# Patient Record
Sex: MALE | Race: WHITE | NOT HISPANIC OR LATINO | Employment: OTHER | URBAN - NONMETROPOLITAN AREA
[De-identification: names, ages, dates, MRNs, and addresses within clinical notes are randomized per-mention and may not be internally consistent; named-entity substitution may affect disease eponyms.]

---

## 2023-12-26 ENCOUNTER — OUTSIDE FACILITY SERVICE (OUTPATIENT)
Dept: CARDIOLOGY | Facility: CLINIC | Age: 88
End: 2023-12-26
Payer: MEDICARE

## 2023-12-26 PROCEDURE — 99222 1ST HOSP IP/OBS MODERATE 55: CPT | Performed by: INTERNAL MEDICINE

## 2023-12-27 ENCOUNTER — OUTSIDE FACILITY SERVICE (OUTPATIENT)
Dept: CARDIOLOGY | Facility: CLINIC | Age: 88
End: 2023-12-27
Payer: MEDICARE

## 2023-12-27 PROCEDURE — 99232 SBSQ HOSP IP/OBS MODERATE 35: CPT | Performed by: INTERNAL MEDICINE

## 2023-12-28 ENCOUNTER — OUTSIDE FACILITY SERVICE (OUTPATIENT)
Dept: CARDIOLOGY | Facility: CLINIC | Age: 88
End: 2023-12-28
Payer: MEDICARE

## 2023-12-28 PROCEDURE — 99232 SBSQ HOSP IP/OBS MODERATE 35: CPT | Performed by: INTERNAL MEDICINE

## 2023-12-29 ENCOUNTER — OUTSIDE FACILITY SERVICE (OUTPATIENT)
Dept: CARDIOLOGY | Facility: CLINIC | Age: 88
End: 2023-12-29
Payer: MEDICARE

## 2023-12-29 PROCEDURE — 99232 SBSQ HOSP IP/OBS MODERATE 35: CPT | Performed by: INTERNAL MEDICINE

## 2024-08-25 ENCOUNTER — APPOINTMENT (OUTPATIENT)
Dept: GENERAL RADIOLOGY | Facility: HOSPITAL | Age: 89
End: 2024-08-25
Payer: MEDICARE

## 2024-08-25 ENCOUNTER — HOSPITAL ENCOUNTER (OUTPATIENT)
Facility: HOSPITAL | Age: 89
Setting detail: OBSERVATION
Discharge: HOME HEALTH | End: 2024-08-26
Attending: EMERGENCY MEDICINE | Admitting: INTERNAL MEDICINE
Payer: MEDICARE

## 2024-08-25 ENCOUNTER — APPOINTMENT (OUTPATIENT)
Dept: CT IMAGING | Facility: HOSPITAL | Age: 89
End: 2024-08-25
Payer: MEDICARE

## 2024-08-25 DIAGNOSIS — R47.81 SLURRED SPEECH: Primary | ICD-10-CM

## 2024-08-25 DIAGNOSIS — R53.1 GENERALIZED WEAKNESS: ICD-10-CM

## 2024-08-25 DIAGNOSIS — R41.841 COGNITIVE COMMUNICATION DEFICIT: ICD-10-CM

## 2024-08-25 DIAGNOSIS — N28.9 ACUTE RENAL INSUFFICIENCY: ICD-10-CM

## 2024-08-25 DIAGNOSIS — R09.02 HYPOXIA: ICD-10-CM

## 2024-08-25 DIAGNOSIS — I50.43 ACUTE ON CHRONIC COMBINED SYSTOLIC AND DIASTOLIC CHF (CONGESTIVE HEART FAILURE): ICD-10-CM

## 2024-08-25 PROBLEM — R25.1 TREMOR: Status: ACTIVE | Noted: 2024-02-22

## 2024-08-25 PROBLEM — I25.10 CORONARY ARTERY DISEASE INVOLVING NATIVE CORONARY ARTERY OF NATIVE HEART WITHOUT ANGINA PECTORIS: Status: ACTIVE | Noted: 2024-08-25

## 2024-08-25 PROBLEM — M48.061 FORAMINAL STENOSIS OF LUMBAR REGION: Status: ACTIVE | Noted: 2021-07-10

## 2024-08-25 PROBLEM — E11.9 TYPE 2 DIABETES MELLITUS: Status: ACTIVE | Noted: 2024-08-25

## 2024-08-25 PROBLEM — Z95.5 HISTORY OF CORONARY ARTERY STENT PLACEMENT: Status: ACTIVE | Noted: 2020-12-31

## 2024-08-25 PROBLEM — G45.9 TIA (TRANSIENT ISCHEMIC ATTACK): Status: ACTIVE | Noted: 2024-08-25

## 2024-08-25 PROBLEM — E78.5 HYPERLIPIDEMIA: Status: ACTIVE | Noted: 2020-12-31

## 2024-08-25 LAB
ALBUMIN SERPL-MCNC: 3.6 G/DL (ref 3.5–5.2)
ALBUMIN/GLOB SERPL: 1.1 G/DL
ALP SERPL-CCNC: 84 U/L (ref 39–117)
ALT SERPL W P-5'-P-CCNC: 12 U/L (ref 1–41)
ANION GAP SERPL CALCULATED.3IONS-SCNC: 9 MMOL/L (ref 5–15)
APTT PPP: 27.7 SECONDS (ref 22–39)
ARTERIAL PATENCY WRIST A: ABNORMAL
AST SERPL-CCNC: 20 U/L (ref 1–40)
ATMOSPHERIC PRESS: ABNORMAL MM[HG]
BASE EXCESS BLDA CALC-SCNC: 5.6 MMOL/L (ref 0–2)
BASOPHILS # BLD AUTO: 0.05 10*3/MM3 (ref 0–0.2)
BASOPHILS NFR BLD AUTO: 0.5 % (ref 0–1.5)
BDY SITE: ABNORMAL
BILIRUB SERPL-MCNC: 0.4 MG/DL (ref 0–1.2)
BILIRUB UR QL STRIP: NEGATIVE
BODY TEMPERATURE: 37
BUN BLDA-MCNC: 25 MG/DL (ref 8–26)
BUN SERPL-MCNC: 25 MG/DL (ref 8–23)
BUN/CREAT SERPL: 17.6 (ref 7–25)
CA-I BLDA-SCNC: 1.15 MMOL/L (ref 1.2–1.32)
CALCIUM SPEC-SCNC: 8.5 MG/DL (ref 8.6–10.5)
CHLORIDE BLDA-SCNC: 98 MMOL/L (ref 98–109)
CHLORIDE SERPL-SCNC: 100 MMOL/L (ref 98–107)
CLARITY UR: CLEAR
CO2 BLDA-SCNC: 27 MMOL/L (ref 24–29)
CO2 BLDA-SCNC: 33.1 MMOL/L (ref 22–33)
CO2 SERPL-SCNC: 26 MMOL/L (ref 22–29)
COHGB MFR BLD: 0.8 % (ref 0–2)
COLOR UR: YELLOW
CREAT BLDA-MCNC: 1.6 MG/DL (ref 0.6–1.3)
CREAT SERPL-MCNC: 1.42 MG/DL (ref 0.76–1.27)
D-LACTATE SERPL-SCNC: 1.1 MMOL/L (ref 0.5–2)
DEPRECATED RDW RBC AUTO: 55.4 FL (ref 37–54)
EGFRCR SERPLBLD CKD-EPI 2021: 41.4 ML/MIN/1.73
EGFRCR SERPLBLD CKD-EPI 2021: 47.8 ML/MIN/1.73
EOSINOPHIL # BLD AUTO: 0.11 10*3/MM3 (ref 0–0.4)
EOSINOPHIL NFR BLD AUTO: 1.1 % (ref 0.3–6.2)
EPAP: 0
ERYTHROCYTE [DISTWIDTH] IN BLOOD BY AUTOMATED COUNT: 14.7 % (ref 12.3–15.4)
FLUAV RNA RESP QL NAA+PROBE: NOT DETECTED
FLUBV RNA RESP QL NAA+PROBE: NOT DETECTED
GLOBULIN UR ELPH-MCNC: 3.2 GM/DL
GLUCOSE BLDC GLUCOMTR-MCNC: 115 MG/DL (ref 70–130)
GLUCOSE SERPL-MCNC: 116 MG/DL (ref 65–99)
GLUCOSE UR STRIP-MCNC: ABNORMAL MG/DL
HCO3 BLDA-SCNC: 31.6 MMOL/L (ref 20–26)
HCT VFR BLD AUTO: 40.5 % (ref 37.5–51)
HCT VFR BLD CALC: 39.1 % (ref 38–51)
HCT VFR BLDA CALC: 40 % (ref 38–51)
HGB BLD-MCNC: 12.8 G/DL (ref 13–17.7)
HGB BLDA-MCNC: 12.8 G/DL (ref 13.5–17.5)
HGB BLDA-MCNC: 13.6 G/DL (ref 12–17)
HGB UR QL STRIP.AUTO: NEGATIVE
HOLD SPECIMEN: NORMAL
IMM GRANULOCYTES # BLD AUTO: 0.18 10*3/MM3 (ref 0–0.05)
IMM GRANULOCYTES NFR BLD AUTO: 1.9 % (ref 0–0.5)
INHALED O2 CONCENTRATION: 21 %
INR PPP: 1.3 (ref 0.8–1.2)
IPAP: 0
KETONES UR QL STRIP: NEGATIVE
LEUKOCYTE ESTERASE UR QL STRIP.AUTO: NEGATIVE
LYMPHOCYTES # BLD AUTO: 1.25 10*3/MM3 (ref 0.7–3.1)
LYMPHOCYTES NFR BLD AUTO: 12.9 % (ref 19.6–45.3)
MCH RBC QN AUTO: 32.1 PG (ref 26.6–33)
MCHC RBC AUTO-ENTMCNC: 31.6 G/DL (ref 31.5–35.7)
MCV RBC AUTO: 101.5 FL (ref 79–97)
METHGB BLD QL: 0.1 % (ref 0–1.5)
MODALITY: ABNORMAL
MONOCYTES # BLD AUTO: 0.85 10*3/MM3 (ref 0.1–0.9)
MONOCYTES NFR BLD AUTO: 8.8 % (ref 5–12)
NEUTROPHILS NFR BLD AUTO: 7.26 10*3/MM3 (ref 1.7–7)
NEUTROPHILS NFR BLD AUTO: 74.8 % (ref 42.7–76)
NITRITE UR QL STRIP: NEGATIVE
NRBC BLD AUTO-RTO: 0 /100 WBC (ref 0–0.2)
OXYHGB MFR BLDV: 78.1 % (ref 94–99)
PAW @ PEAK INSP FLOW SETTING VENT: 0 CMH2O
PCO2 BLDA: 50.7 MM HG (ref 35–45)
PCO2 TEMP ADJ BLD: 50.7 MM HG (ref 35–48)
PH BLDA: 7.4 PH UNITS (ref 7.35–7.45)
PH UR STRIP.AUTO: <=5 [PH] (ref 5–8)
PH, TEMP CORRECTED: 7.4 PH UNITS
PLATELET # BLD AUTO: 132 10*3/MM3 (ref 140–450)
PMV BLD AUTO: 10.9 FL (ref 6–12)
PO2 BLDA: 48.1 MM HG (ref 83–108)
PO2 TEMP ADJ BLD: 48.1 MM HG (ref 83–108)
POTASSIUM BLDA-SCNC: 4.7 MMOL/L (ref 3.5–4.9)
POTASSIUM SERPL-SCNC: 4.8 MMOL/L (ref 3.5–5.2)
PROCALCITONIN SERPL-MCNC: 0.07 NG/ML (ref 0–0.25)
PROT SERPL-MCNC: 6.8 G/DL (ref 6–8.5)
PROT UR QL STRIP: NEGATIVE
PROTHROMBIN TIME: 14.9 SECONDS (ref 12.8–15.2)
QT INTERVAL: 440 MS
QTC INTERVAL: 491 MS
RBC # BLD AUTO: 3.99 10*6/MM3 (ref 4.14–5.8)
SARS-COV-2 RNA RESP QL NAA+PROBE: NOT DETECTED
SODIUM BLD-SCNC: 138 MMOL/L (ref 138–146)
SODIUM SERPL-SCNC: 135 MMOL/L (ref 136–145)
SP GR UR STRIP: 1.02 (ref 1–1.03)
TOTAL RATE: 0 BREATHS/MINUTE
TROPONIN T SERPL HS-MCNC: 34 NG/L
TSH SERPL DL<=0.05 MIU/L-ACNC: 0.95 UIU/ML (ref 0.27–4.2)
UROBILINOGEN UR QL STRIP: ABNORMAL
VENTILATOR MODE: ABNORMAL
WBC NRBC COR # BLD AUTO: 9.7 10*3/MM3 (ref 3.4–10.8)
WHOLE BLOOD HOLD COAG: NORMAL
WHOLE BLOOD HOLD SPECIMEN: NORMAL

## 2024-08-25 PROCEDURE — 82375 ASSAY CARBOXYHB QUANT: CPT

## 2024-08-25 PROCEDURE — 85730 THROMBOPLASTIN TIME PARTIAL: CPT | Performed by: EMERGENCY MEDICINE

## 2024-08-25 PROCEDURE — 82805 BLOOD GASES W/O2 SATURATION: CPT

## 2024-08-25 PROCEDURE — P9612 CATHETERIZE FOR URINE SPEC: HCPCS

## 2024-08-25 PROCEDURE — G0378 HOSPITAL OBSERVATION PER HR: HCPCS

## 2024-08-25 PROCEDURE — 84484 ASSAY OF TROPONIN QUANT: CPT | Performed by: EMERGENCY MEDICINE

## 2024-08-25 PROCEDURE — 83050 HGB METHEMOGLOBIN QUAN: CPT

## 2024-08-25 PROCEDURE — 93005 ELECTROCARDIOGRAM TRACING: CPT | Performed by: EMERGENCY MEDICINE

## 2024-08-25 PROCEDURE — 85014 HEMATOCRIT: CPT | Performed by: EMERGENCY MEDICINE

## 2024-08-25 PROCEDURE — 99223 1ST HOSP IP/OBS HIGH 75: CPT | Performed by: INTERNAL MEDICINE

## 2024-08-25 PROCEDURE — 87636 SARSCOV2 & INF A&B AMP PRB: CPT | Performed by: EMERGENCY MEDICINE

## 2024-08-25 PROCEDURE — 70450 CT HEAD/BRAIN W/O DYE: CPT

## 2024-08-25 PROCEDURE — 99291 CRITICAL CARE FIRST HOUR: CPT

## 2024-08-25 PROCEDURE — 25810000003 SODIUM CHLORIDE 0.9 % SOLUTION: Performed by: INTERNAL MEDICINE

## 2024-08-25 PROCEDURE — 71045 X-RAY EXAM CHEST 1 VIEW: CPT

## 2024-08-25 PROCEDURE — 87040 BLOOD CULTURE FOR BACTERIA: CPT | Performed by: EMERGENCY MEDICINE

## 2024-08-25 PROCEDURE — 80047 BASIC METABLC PNL IONIZED CA: CPT | Performed by: EMERGENCY MEDICINE

## 2024-08-25 PROCEDURE — 36600 WITHDRAWAL OF ARTERIAL BLOOD: CPT

## 2024-08-25 PROCEDURE — 80053 COMPREHEN METABOLIC PANEL: CPT | Performed by: EMERGENCY MEDICINE

## 2024-08-25 PROCEDURE — 96372 THER/PROPH/DIAG INJ SC/IM: CPT

## 2024-08-25 PROCEDURE — 99214 OFFICE O/P EST MOD 30 MIN: CPT | Performed by: NURSE PRACTITIONER

## 2024-08-25 PROCEDURE — 84443 ASSAY THYROID STIM HORMONE: CPT | Performed by: INTERNAL MEDICINE

## 2024-08-25 PROCEDURE — 84145 PROCALCITONIN (PCT): CPT | Performed by: EMERGENCY MEDICINE

## 2024-08-25 PROCEDURE — 83605 ASSAY OF LACTIC ACID: CPT | Performed by: EMERGENCY MEDICINE

## 2024-08-25 PROCEDURE — 36415 COLL VENOUS BLD VENIPUNCTURE: CPT

## 2024-08-25 PROCEDURE — 87154 CUL TYP ID BLD PTHGN 6+ TRGT: CPT | Performed by: EMERGENCY MEDICINE

## 2024-08-25 PROCEDURE — 85610 PROTHROMBIN TIME: CPT | Performed by: EMERGENCY MEDICINE

## 2024-08-25 PROCEDURE — 87147 CULTURE TYPE IMMUNOLOGIC: CPT | Performed by: EMERGENCY MEDICINE

## 2024-08-25 PROCEDURE — 85025 COMPLETE CBC W/AUTO DIFF WBC: CPT | Performed by: EMERGENCY MEDICINE

## 2024-08-25 PROCEDURE — 81003 URINALYSIS AUTO W/O SCOPE: CPT | Performed by: EMERGENCY MEDICINE

## 2024-08-25 PROCEDURE — 25010000002 ENOXAPARIN PER 10 MG: Performed by: INTERNAL MEDICINE

## 2024-08-25 PROCEDURE — 96361 HYDRATE IV INFUSION ADD-ON: CPT

## 2024-08-25 RX ORDER — BISACODYL 10 MG
10 SUPPOSITORY, RECTAL RECTAL DAILY PRN
Status: DISCONTINUED | OUTPATIENT
Start: 2024-08-25 | End: 2024-08-26 | Stop reason: HOSPADM

## 2024-08-25 RX ORDER — FERROUS SULFATE 325(65) MG
325 TABLET ORAL
COMMUNITY

## 2024-08-25 RX ORDER — RISPERIDONE 1 MG/1
1 TABLET ORAL DAILY
COMMUNITY

## 2024-08-25 RX ORDER — DOXYCYCLINE HYCLATE 100 MG
100 TABLET ORAL 2 TIMES DAILY
COMMUNITY
End: 2024-08-26 | Stop reason: HOSPADM

## 2024-08-25 RX ORDER — RISPERIDONE 1 MG/1
1 TABLET ORAL NIGHTLY
Status: DISCONTINUED | OUTPATIENT
Start: 2024-08-25 | End: 2024-08-26 | Stop reason: HOSPADM

## 2024-08-25 RX ORDER — ENOXAPARIN SODIUM 100 MG/ML
30 INJECTION SUBCUTANEOUS EVERY 24 HOURS
Status: DISCONTINUED | OUTPATIENT
Start: 2024-08-25 | End: 2024-08-26

## 2024-08-25 RX ORDER — ACETAMINOPHEN 325 MG/1
650 TABLET ORAL EVERY 6 HOURS PRN
Status: DISCONTINUED | OUTPATIENT
Start: 2024-08-25 | End: 2024-08-26 | Stop reason: HOSPADM

## 2024-08-25 RX ORDER — SODIUM CHLORIDE 0.9 % (FLUSH) 0.9 %
10 SYRINGE (ML) INJECTION AS NEEDED
Status: DISCONTINUED | OUTPATIENT
Start: 2024-08-25 | End: 2024-08-26 | Stop reason: HOSPADM

## 2024-08-25 RX ORDER — RISPERIDONE 0.5 MG/1
0.5 TABLET ORAL DAILY
COMMUNITY
End: 2024-08-26 | Stop reason: HOSPADM

## 2024-08-25 RX ORDER — LANOLIN ALCOHOL/MO/W.PET/CERES
1000 CREAM (GRAM) TOPICAL DAILY
Status: DISCONTINUED | OUTPATIENT
Start: 2024-08-26 | End: 2024-08-26 | Stop reason: HOSPADM

## 2024-08-25 RX ORDER — ASPIRIN 300 MG/1
300 SUPPOSITORY RECTAL DAILY
Status: DISCONTINUED | OUTPATIENT
Start: 2024-08-26 | End: 2024-08-26 | Stop reason: HOSPADM

## 2024-08-25 RX ORDER — INSULIN LISPRO 100 [IU]/ML
2-7 INJECTION, SOLUTION INTRAVENOUS; SUBCUTANEOUS
Status: DISCONTINUED | OUTPATIENT
Start: 2024-08-26 | End: 2024-08-26 | Stop reason: HOSPADM

## 2024-08-25 RX ORDER — ATORVASTATIN CALCIUM 40 MG/1
40 TABLET, FILM COATED ORAL NIGHTLY
Status: DISCONTINUED | OUTPATIENT
Start: 2024-08-25 | End: 2024-08-26 | Stop reason: HOSPADM

## 2024-08-25 RX ORDER — ATORVASTATIN CALCIUM 80 MG/1
80 TABLET, FILM COATED ORAL DAILY
COMMUNITY

## 2024-08-25 RX ORDER — BISACODYL 5 MG/1
5 TABLET, DELAYED RELEASE ORAL DAILY PRN
Status: DISCONTINUED | OUTPATIENT
Start: 2024-08-25 | End: 2024-08-26 | Stop reason: HOSPADM

## 2024-08-25 RX ORDER — INSULIN ASPART 100 [IU]/ML
53 INJECTION, SUSPENSION SUBCUTANEOUS NIGHTLY
COMMUNITY

## 2024-08-25 RX ORDER — IBUPROFEN 600 MG/1
1 TABLET ORAL
Status: DISCONTINUED | OUTPATIENT
Start: 2024-08-25 | End: 2024-08-26 | Stop reason: HOSPADM

## 2024-08-25 RX ORDER — METOPROLOL SUCCINATE 25 MG/1
25 TABLET, EXTENDED RELEASE ORAL DAILY
Status: DISCONTINUED | OUTPATIENT
Start: 2024-08-26 | End: 2024-08-26 | Stop reason: HOSPADM

## 2024-08-25 RX ORDER — ASPIRIN 81 MG/1
81 TABLET ORAL DAILY
COMMUNITY

## 2024-08-25 RX ORDER — FUROSEMIDE 20 MG
20 TABLET ORAL 2 TIMES DAILY
COMMUNITY

## 2024-08-25 RX ORDER — NICOTINE POLACRILEX 4 MG
15 LOZENGE BUCCAL
Status: DISCONTINUED | OUTPATIENT
Start: 2024-08-25 | End: 2024-08-26 | Stop reason: HOSPADM

## 2024-08-25 RX ORDER — ERGOCALCIFEROL 1.25 MG/1
50000 CAPSULE, LIQUID FILLED ORAL WEEKLY
COMMUNITY
Start: 2024-08-26

## 2024-08-25 RX ORDER — AMOXICILLIN 250 MG
2 CAPSULE ORAL 2 TIMES DAILY
Status: DISCONTINUED | OUTPATIENT
Start: 2024-08-26 | End: 2024-08-26 | Stop reason: HOSPADM

## 2024-08-25 RX ORDER — ASPIRIN 81 MG/1
81 TABLET, CHEWABLE ORAL DAILY
Status: DISCONTINUED | OUTPATIENT
Start: 2024-08-26 | End: 2024-08-26 | Stop reason: HOSPADM

## 2024-08-25 RX ORDER — DEXTROSE MONOHYDRATE 25 G/50ML
25 INJECTION, SOLUTION INTRAVENOUS
Status: DISCONTINUED | OUTPATIENT
Start: 2024-08-25 | End: 2024-08-26 | Stop reason: HOSPADM

## 2024-08-25 RX ORDER — ISOSORBIDE MONONITRATE 30 MG/1
30 TABLET, EXTENDED RELEASE ORAL 2 TIMES DAILY
COMMUNITY

## 2024-08-25 RX ORDER — ALLOPURINOL 300 MG/1
300 TABLET ORAL DAILY
Status: DISCONTINUED | OUTPATIENT
Start: 2024-08-26 | End: 2024-08-26 | Stop reason: HOSPADM

## 2024-08-25 RX ORDER — ALLOPURINOL 300 MG/1
300 TABLET ORAL DAILY
COMMUNITY

## 2024-08-25 RX ORDER — HYDROXYZINE HYDROCHLORIDE 25 MG/1
25 TABLET, FILM COATED ORAL 2 TIMES DAILY PRN
COMMUNITY

## 2024-08-25 RX ORDER — SODIUM CHLORIDE 9 MG/ML
100 INJECTION, SOLUTION INTRAVENOUS CONTINUOUS
Status: ACTIVE | OUTPATIENT
Start: 2024-08-25 | End: 2024-08-26

## 2024-08-25 RX ORDER — INSULIN ASPART 100 [IU]/ML
33 INJECTION, SUSPENSION SUBCUTANEOUS DAILY
COMMUNITY

## 2024-08-25 RX ORDER — LANOLIN ALCOHOL/MO/W.PET/CERES
1000 CREAM (GRAM) TOPICAL DAILY
COMMUNITY

## 2024-08-25 RX ORDER — ISOSORBIDE MONONITRATE 30 MG/1
30 TABLET, EXTENDED RELEASE ORAL
Status: DISCONTINUED | OUTPATIENT
Start: 2024-08-26 | End: 2024-08-26 | Stop reason: HOSPADM

## 2024-08-25 RX ORDER — RISPERIDONE 0.25 MG/1
0.5 TABLET ORAL DAILY
Status: DISCONTINUED | OUTPATIENT
Start: 2024-08-26 | End: 2024-08-26 | Stop reason: HOSPADM

## 2024-08-25 RX ORDER — POLYETHYLENE GLYCOL 3350 17 G/17G
17 POWDER, FOR SOLUTION ORAL DAILY PRN
Status: DISCONTINUED | OUTPATIENT
Start: 2024-08-25 | End: 2024-08-26 | Stop reason: HOSPADM

## 2024-08-25 RX ORDER — RANOLAZINE 500 MG/1
500 TABLET, EXTENDED RELEASE ORAL 2 TIMES DAILY
COMMUNITY

## 2024-08-25 RX ORDER — METOPROLOL SUCCINATE 25 MG/1
25 TABLET, EXTENDED RELEASE ORAL DAILY
COMMUNITY

## 2024-08-25 RX ADMIN — SODIUM CHLORIDE 100 ML/HR: 9 INJECTION, SOLUTION INTRAVENOUS at 21:44

## 2024-08-25 RX ADMIN — Medication 10 ML: at 21:32

## 2024-08-25 RX ADMIN — ENOXAPARIN SODIUM 30 MG: 30 INJECTION SUBCUTANEOUS at 21:32

## 2024-08-25 RX ADMIN — ATORVASTATIN CALCIUM 40 MG: 40 TABLET, FILM COATED ORAL at 21:33

## 2024-08-25 RX ADMIN — Medication 10 ML: at 21:58

## 2024-08-25 RX ADMIN — SODIUM CHLORIDE 1000 ML: 9 INJECTION, SOLUTION INTRAVENOUS at 21:44

## 2024-08-25 NOTE — Clinical Note
Level of Care: Telemetry [5]   Diagnosis: TIA (transient ischemic attack) [979242]   Admitting Physician: ANA LEMOS [[id number]]

## 2024-08-25 NOTE — CONSULTS
Stroke Consult Note    Patient Name: Gurdeep Burrows   MRN: [id number]  Age: 89 y.o.  Sex: male  : 1935    Primary Care Physician: Aliza Manuel PA  Referring Physician:  Dr. Wyatt Villanueva    TIME STROKE TEAM CALLED: 1330 EST     TIME PATIENT SEEN: 1335 EST    Handedness: Right  Race:     Chief Complaint/Reason for Consultation: Altered mental status, slurred speech    Subjective .  HPI: Gurdeep Burrows is an 89 year old  male with a PMH significant for dementia, chronic debility, T2DM, tremors, respiratory failure, chronic heart failure, obesity, anemia, CKD.  He presents to the [facility] emergency department via EMS with complaints of slurred speech and bilateral lower extremity weakness.  Unfortunately family has not been able to be contacted so PMH is taken from his EMR.  EMS reports that he had a recent hospitalization and was taken to rehab facility at the time of discharge.  Per EMS they report that yesterday evening family picked him up from the rehab facility and took him home, he is essentially wheelchair-bound at baseline.  He does live alone per their report. He was noted to have slurred speech at that time. Family attempted to contact him this morning and when they were unable after several attempts they presented to his home and EMS was called at that time.  On arrival to our facility NIH is noted to be 6. He is able to follow one step commands without issue. Speech is mildly dysarthric, however it is intact. CTH was negative for any acute intracranial abnormalities. Secondary to lack of unilateral symptoms, unclear LKW, elevated creatinine, and generalized weakness CTA deferred at this time to avoid contrast load. He will be admitted to [facility] for further work-up and evaluation.     Last Known Normal Date/Time: Unknown     Review of Systems   Unable to perform ROS: Mental status change         No past medical history on file.  No past surgical history on  file.  No family history on file.  Social History     Socioeconomic History    Marital status:      No Known Allergies  Prior to Admission medications    Not on File             Objective     Temp:  [97.6 °F (36.4 °C)] 97.6 °F (36.4 °C)  Heart Rate:  [75] 75  Resp:  [18] 18  BP: (107)/(71) 107/71    Neurological Exam  Mental Status  Awake and alert. Oriented only to person and place. Mild dysarthria present. Language is fluent with no aphasia.    Cranial Nerves  CN II: Visual fields full to confrontation.  CN III, IV, VI: Extraocular movements intact bilaterally. Pupils equal round and reactive to light bilaterally.  CN V:  Right: Facial sensation is normal.  Left: Facial sensation is normal on the left.  CN VII:  Right: There is no facial weakness.  Left: There is no facial weakness.  CN VIII: Hearing appears to be intact bilaterally .  CN IX, X:  Right: Palate is normal.  Left: Palate is normal.    Motor  Normal muscle bulk throughout. No fasciculations present. Normal muscle tone.  BLE weakness.    Sensory  Light touch is normal in upper and lower extremities.     Coordination    NO overt ataxia or dysmetria to note  .    Gait   Abnormal gait.  Not observed.      Physical Exam  Constitutional:       General: He is awake.      Appearance: He is obese. He is ill-appearing.   HENT:      Head: Normocephalic.      Mouth/Throat:      Pharynx: Oropharynx is clear.   Eyes:      Extraocular Movements: Extraocular movements intact.      Pupils: Pupils are equal, round, and reactive to light.   Cardiovascular:      Rate and Rhythm: Normal rate and regular rhythm.   Pulmonary:      Effort: Pulmonary effort is normal.   Abdominal:      General: Abdomen is flat.   Musculoskeletal:         General: Normal range of motion.   Skin:     General: Skin is warm.   Neurological:      Mental Status: He is alert.      Cranial Nerves: Dysarthria present.      Motor: Weakness present.      Gait: Gait abnormal.   Psychiatric:          Mood and Affect: Mood normal.         Behavior: Behavior normal.       Acute Stroke Data    IV Thrombolytic (TPA/Tenecteplase) Inclusion / Exclusion Criteria    Time: 17:10 EDT  Person Administering Scale: TIFFANY Perez    Inclusion Criteria  [x]   18 years of age or greater   []   Onset of symptoms < 4.5 hours before beginning treatment (stroke onset = time patient was last seen well or without symptoms).   []   Diagnosis of acute ischemic stroke causing measurable disabling deficit (Complete Hemianopia, Any Aphasia, Visual or Sensory Extinction, Any weakness limiting sustained effort against gravity)   []   Any remaining deficit considered potentially disabling in view of patient and practitioner   Exclusion criteria (Do not proceed with Alteplase if any are checked under exclusion criteria)  [x]   Onset unknown or GREATER than 4.5 hours   []   ICH on CT/MRI   []   CT demonstrates hypodensity representing acute or subacute infarct   []   Significant head trauma or prior stroke in the previous 3 months   []   Symptoms suggestive of subarachnoid hemorrhage   []   History of un-ruptured intracranial aneurysm GREATER than 10 mm   []   Recent intracranial or intraspinal surgery within the last 3 months   []   Arterial puncture at a non-compressible site in the previous 7 days   []   Active internal bleeding   []   Acute bleeding tendency   []   Platelet count LESS than 100,000 for known hematological diseases such as leukemia, thrombocytopenia or chronic cirrhosis   []   Current use of anticoagulant with INR GREATER than 1.7 or PT GREATER than 15 seconds, aPTT GREATER than 40 seconds   []   Heparin received within 48 hours, resulting in abnormally elevated aPTT GREATER than upper limit of normal   []   Current use of direct thrombin inhibitors or direct factor Xa inhibitors in the past 48 hours   []   Elevated blood pressure refractory to treatment (systolic GREATER than 185 mm/Hg or diastolic  GREATER than  110 mm/Hg   []   Suspected infective endocarditis and aortic arch dissection   []   Current use of therapeutic treatment dose of low-molecular-weight heparin (LMWH) within the previous 24 hours   []   Structural GI malignancy or bleed   Relative exclusion for all patients  []   Only minor nondisabling symptoms   []   Pregnancy   []   Seizure at onset with postictal residual neurological impairments   []   Major surgery or previous trauma within past 14 days   []   History of previous spontaneous ICH, intracranial neoplasm, or AV malformation   []   Postpartum (within previous 14 days)   []   Recent GI or urinary tract hemorrhage (within previous 21 days)   []   Recent acute MI (within previous 3 months)   []   History of unruptured intracranial aneurysm LESS than 10 mm   []   History of ruptured intracranial aneurysm   []   Blood glucose LESS than 50 mg/dL (2.7 mmol/L)   []   Dural puncture within the last 7 days   []   Known GREATER than 10 cerebral microbleeds   Additional exclusions for patients with symptoms onset between 3 and 4.5 hours.  []   Age > 80.   []   On any anticoagulants regardless of INR  >>> Warfarin (Coumadin), Heparin, Enoxaparin (Lovenox), fondaparinux (Arixtra), bivalirudin (Angiomax), Argatroban, dabigatran (Pradaxa), rivaroxaban (Xarelto), or apixaban (Eliquis)   []   Severe stroke (NIHSS > 25).   []   History of BOTH diabetes and previous ischemic stroke.   []   The risks and benefits have been discussed with the patient or family related to the administration of IV alteplase for stroke symptoms.   []   I have discussed and reviewed the patient's case and imaging with the attending prior to IV Thrombolytic (TPA/Tenecteplase).   N/A Time Thrombolytic administered       Hospital Meds:  Scheduled-    Infusions-     PRNs-   sodium chloride    Functional Status Prior to Current Stroke/Lester Score: 3    NIH Stroke Scale  Time: 17:10 EDT  Person Administering Scale: Adelia Galvin, APRN    1a   Level of consciousness: 0=alert; keenly responsive   1b. LOC questions:  1=Performs one task correctly   1c. LOC commands: 0=Performs both tasks correctly   2.  Best Gaze: 0=normal   3.  Visual: 0=No visual loss   4. Facial Palsy: 0=Normal symmetric movement   5a.  Motor left arm: 0=No drift, limb holds 90 (or 45) degrees for full 10 seconds   5b.  Motor right arm: 0=No drift, limb holds 90 (or 45) degrees for full 10 seconds   6a. motor left le=Some effort against gravity, limb cannot get to or maintain (if cured) 90 (or 45) degrees, drifts down to bed, but has some effort against gravity   6b  Motor right le=Some effort against gravity, limb cannot get to or maintain (if cured) 90 (or 45) degrees, drifts down to bed, but has some effort against gravity   7. Limb Ataxia: 0=Absent   8.  Sensory: 0=Normal; no sensory loss   9. Best Language:  0=No aphasia, normal   10. Dysarthria: 1=Mild to moderate, patient slurs at least some words and at worst, can be understood with some difficulty   11. Extinction and Inattention: 0=No abnormality    Total:   6       Results Reviewed:  I have personally reviewed current lab, radiology, and data and agree with results.    ECG 12 Lead ED Triage Standing Order; Acute Stroke (Onset <24 hrs)   Final Result   Test Reason : ED Triage Standing Order~   Blood Pressure :   */*   mmHG   Vent. Rate :  75 BPM     Atrial Rate :  75 BPM      P-R Int : 232 ms          QRS Dur :  86 ms       QT Int : 440 ms       P-R-T Axes :  48  61  76 degrees      QTc Int : 491 ms      Sinus rhythm with 1st degree AV block   Low voltage QRS   Prolonged QT   Abnormal ECG   No previous ECGs available   Confirmed by VIRGINIA STAUFFER MD ([id number]) on 2024 2:32:15 PM      Referred By: EDMD           Confirmed By: VIRGINIA STAUFFER MD          CT Head Without Contrast Stroke Protocol    Result Date: 2024  CT HEAD WO CONTRAST STROKE PROTOCOL Date of Exam: 2024 1:35 PM EDT Indication: Neuro deficit,  acute, stroke suspected Neuro Deficit, acute, Stroke suspected. Comparison: None available. Technique: Axial CT images were obtained of the head without contrast administration.  Reconstructed coronal images were also obtained. Automated exposure control and iterative construction methods were used. Scan Time: 1:35 p.m. Results discussed with stroke team at 143. Findings: Parenchyma:No acute intraparenchymal hemorrhage. No loss of gray-white differentiation to suggest large territory infarct. Mild parenchymal volume loss. Scattered periventricular and subcortical white matter hypodensities, nonspecific, but most often consistent with small vessel ischemic changes. No midline shift or herniation. Ventricles and extra axial spaces:Prominent ventricles and sulci secondary to volume loss. No extra axial fluid collection seen. Other:Lens replacements. Paranasal sinuses are clear. Mastoid air cells are clear. Calvarium is intact. Intracranial atherosclerotic calcification is present.     Impression: Impression: No evidence of acute intracranial hemorrhage or large territory infarct. Chronic changes as above. Electronically Signed: Mp Santos MD  8/25/2024 1:44 PM EDT  Workstation ID: [id number]     Lab Results   Component Value Date    GLUCOSE 116 (H) 08/25/2024    BUN 25 (H) 08/25/2024    CREATININE 1.42 (H) 08/25/2024     (L) 08/25/2024    K 4.8 08/25/2024     08/25/2024    CALCIUM 8.5 (L) 08/25/2024    PROTEINTOT 6.8 08/25/2024    ALBUMIN 3.6 08/25/2024    ALT 12 08/25/2024    AST 20 08/25/2024    ALKPHOS 84 08/25/2024    BILITOT 0.4 08/25/2024    GLOB 3.2 08/25/2024    AGRATIO 1.1 08/25/2024    BCR 17.6 08/25/2024    ANIONGAP 9.0 08/25/2024    EGFR 47.8 (L) 08/25/2024     WBC   Date Value Ref Range Status   08/25/2024 9.70 3.40 - 10.80 10*3/mm3 Final     RBC   Date Value Ref Range Status   08/25/2024 3.99 (L) 4.14 - 5.80 10*6/mm3 Final     Hemoglobin   Date Value Ref Range Status   08/25/2024 12.8 (L)  13.0 - 17.7 g/dL Final   08/25/2024 13.6 12.0 - 17.0 g/dL Final     Comment:     Serial Number: [id number]:  [id number]     Hematocrit   Date Value Ref Range Status   08/25/2024 40.5 37.5 - 51.0 % Final   08/25/2024 40 38 - 51 % Final     MCV   Date Value Ref Range Status   08/25/2024 101.5 (H) 79.0 - 97.0 fL Final     MCH   Date Value Ref Range Status   08/25/2024 32.1 26.6 - 33.0 pg Final     MCHC   Date Value Ref Range Status   08/25/2024 31.6 31.5 - 35.7 g/dL Final     RDW   Date Value Ref Range Status   08/25/2024 14.7 12.3 - 15.4 % Final     RDW-SD   Date Value Ref Range Status   08/25/2024 55.4 (H) 37.0 - 54.0 fl Final     MPV   Date Value Ref Range Status   08/25/2024 10.9 6.0 - 12.0 fL Final     Platelets   Date Value Ref Range Status   08/25/2024 132 (L) 140 - 450 10*3/mm3 Final     Neutrophil %   Date Value Ref Range Status   08/25/2024 74.8 42.7 - 76.0 % Final     Lymphocyte %   Date Value Ref Range Status   08/25/2024 12.9 (L) 19.6 - 45.3 % Final     Monocyte %   Date Value Ref Range Status   08/25/2024 8.8 5.0 - 12.0 % Final     Eosinophil %   Date Value Ref Range Status   08/25/2024 1.1 0.3 - 6.2 % Final     Basophil %   Date Value Ref Range Status   08/25/2024 0.5 0.0 - 1.5 % Final     Immature Grans %   Date Value Ref Range Status   08/25/2024 1.9 (H) 0.0 - 0.5 % Final     Neutrophils, Absolute   Date Value Ref Range Status   08/25/2024 7.26 (H) 1.70 - 7.00 10*3/mm3 Final     Lymphocytes, Absolute   Date Value Ref Range Status   08/25/2024 1.25 0.70 - 3.10 10*3/mm3 Final     Monocytes, Absolute   Date Value Ref Range Status   08/25/2024 0.85 0.10 - 0.90 10*3/mm3 Final     Eosinophils, Absolute   Date Value Ref Range Status   08/25/2024 0.11 0.00 - 0.40 10*3/mm3 Final     Basophils, Absolute   Date Value Ref Range Status   08/25/2024 0.05 0.00 - 0.20 10*3/mm3 Final     Immature Grans, Absolute   Date Value Ref Range Status   08/25/2024 0.18 (H) 0.00 - 0.05 10*3/mm3 Final     nRBC   Date Value Ref  Range Status   08/25/2024 0.0 0.0 - 0.2 /100 WBC Final       Assessment/Plan:  89-year-old male with vascular risk factors who presented [facility] emergency department via EMS for complaints of dysarthria as well as bilateral lower extremity weakness.  Not deemed to be an appropriate IV thrombolytic therapy candidate second to extended last known well.  Not deemed to be an appropriate emergent endovascular therapy candidate as any potential benefit would be outweighed by significant risk.    Antiplatelet PTA: Unknown  Coagulant PTA: Unknown      Bilateral lower extremity weakness, dysarthria  -Unclear LKW  -Dysphagia screening prior to any p.o. intake  -CT head completed in the emergency department is negative for any acute intracranial process  -MRI brain without contrast, routine  -TTE defer bubble  -Aspirin 81 mg daily  -Lipitor 40 mg nightly  -Normal BP goals, Overall management per Hospital Medicine Team   -Hemoglobin A1c and FLP in a.m.  -Further metabolic workup per hospital medicine team  -PT/OT/SLP  -Fall precautions  -Case Management assistance for discharge planning    Neurology will continue to follow.  Plan of care discussed with EDMD as well as the patient.  Thanks for consult care with patient.  Please call with any further questions or concerns.      Adelia Galvin, APRN  August 25, 2024  14:38 EDT

## 2024-08-25 NOTE — H&P
[facility] Medicine Services  HISTORY AND PHYSICAL    Patient Name: Gurdeep Burrows  : 1935  MRN: [id number]  Primary Care Physician: Aliza Manuel PA    Subjective   Subjective     Chief Complaint:slurred speech    HPI:  Gurdeep Burrows is a 89 y.o. male who  has a past medical history of CAD (coronary artery disease) (2023), Dementia, Diabetes, Hypertension, and Obesity hypoventilation syndrome. who was recently admitted to [facility] for hallucinations - He was diagnosed with frontotemporal dementia, started on as needed antipsychotics and had good response.  He then has been in rehab in [place] for several weeks and was discharged 2 days ago.  Yesterday went up to have dinner, cemetery, went home by himself.  Was tired that he is can eat peaches and go to bed.  This morning when the family went to check on him, because he did not answer the phone. They reported that his speech was intelligible.  They called EMS.  His glucose was okay.  He was brought to [facility] as a code stroke.  Due to unknown last known well.  Patient was not a candidate for intervention.  CT scan did not show any acute ischemia.  And upon arrival patient's speech difficulty had improved.  Patient reports that this morning he felt like his arms and legs were too heavy to even roll over in bed.  Per the family's report patient had been on and off oxygen while at the nursing home.  But did not have any oxygen at home.  Otherwise he been doing well without any concerns.    Review of Systems   Patient denies weight loss, headaches, changes in vision, fever, chills, sore throat, shortness of breath, cough, nausea or vomiting, diarrhea, abdominal pain or distension, change in urine output or habits, joint pain, rash, itching, numbness/tingling, weakness or bleeding.  Cold intolerance  Otherwise complete ROS is negative except as mentioned in the HPI.    Personal History     PMH: He  has a  past medical history of CAD (coronary artery disease) (12/25/2023), Dementia, Diabetes, Hypertension, and Obesity hypoventilation syndrome.   PSxH: He  has a past surgical history that includes Knee Arthroplasty; Back surgery; Left Heart Cath; ORIF hip fracture (2020); and Cataract extraction.         FH: His family history includes Stroke in his father; Suicide Attempts in his mother.   SH: He  reports that he has never smoked. He has never used smokeless tobacco. He reports that he does not currently use alcohol. He reports that he does not currently use drugs.     Medications: Reviewed from nursing home list.       No Known Allergies    Objective   Objective     Vital Signs:   Temp:  [97.6 °F (36.4 °C)] 97.6 °F (36.4 °C)  Heart Rate:  [68-75] 71  Resp:  [18] 18  BP: (101-127)/(58-71) 127/71    Constitutional: Awake, alert, interactive and pleasant, hard of hearing  Eyes: clear sclerae, no conjunctival injection  HENT: NCAT, mucous membranes moist  Neck: no masses or lymphadenopathy, trachea midline  Respiratory: good breath sounds bilaterally, respirations unlabored  Cardiovascular: RRR, soft systolic murmur appreciated, palpable peripheral pulses  Abdomen:  soft, no HSM or masses palpable, not tender or distended  Musculoskeletal: No peripheral edema, clubbing or cyanosis  Neurologic: Oriented x 3, no facial droop                      Strength symmetric in all extremities                     Cranial Nerves grossly intact, speech clear  Skin: No rashes or jaundice  Psychiatric: Appropriate mood, insight      Result Review:  I have personally reviewed the results from the time of this admission   to 8/25/2024 19:42 EDT and agree with these findings:  [x]  Laboratory  [x]  Microbiology  [x]  Radiology  [x]  EKG/Telemetry   []  Cardiology/Vascular   []  Pathology  [x]  Old records  []  Other:  Most notable findings include: CT scan with only chronic changes, slightly low platelets and increased INR.  MCV slightly  increased      LAB RESULTS:      Lab 08/25/24  1347 08/25/24  1344 08/25/24  1343   WBC 9.70  --   --    HEMOGLOBIN 12.8*  --   --    HEMOGLOBIN, POC  --  13.6  --    HEMATOCRIT 40.5  --   --    HEMATOCRIT POC  --  40  --    PLATELETS 132*  --   --    NEUTROS ABS 7.26*  --   --    IMMATURE GRANS (ABS) 0.18*  --   --    LYMPHS ABS 1.25  --   --    MONOS ABS 0.85  --   --    EOS ABS 0.11  --   --    .5*  --   --    PROCALCITONIN 0.07  --   --    LACTATE 1.1  --   --    PROTIME  --   --  14.9   APTT 27.7  --   --          Lab 08/25/24  1347 08/25/24  1344   SODIUM 135*  --    POTASSIUM 4.8  --    CHLORIDE 100  --    CO2 26.0  --    ANION GAP 9.0  --    BUN 25*  --    CREATININE 1.42* 1.60*   EGFR 47.8* 41.4*   GLUCOSE 116*  --    CALCIUM 8.5*  --          Lab 08/25/24  1347   TOTAL PROTEIN 6.8   ALBUMIN 3.6   GLOBULIN 3.2   ALT (SGPT) 12   AST (SGOT) 20   BILIRUBIN 0.4   ALK PHOS 84         Lab 08/25/24  1347 08/25/24  1343   HSTROP T 34*  --    PROTIME  --  14.9   INR  --  1.3*                 Brief Urine Lab Results  (Last result in the past 365 days)        Color   Clarity   Blood   Leuk Est   Nitrite   Protein   CREAT   Urine HCG        08/25/24 1524 Yellow   Clear   Negative   Negative   Negative   Negative                 COVID19   Date Value Ref Range Status   08/25/2024 Not Detected Not Detected - Ref. Range Final       XR Chest 1 View    Result Date: 8/25/2024  XR CHEST 1 VW Date of Exam: 8/25/2024 1:35 PM EDT Indication: Acute Stroke Protocol (onset < 12 hrs) Comparison: None available. Findings: No focal consolidation. No pneumothorax or pleural effusion. Cardiac size is normal. The visualized clavicles appear intact. No displaced rib fractures. The visualized upper abdomen is normal.     Impression: Impression: No acute cardiopulmonary disease. Electronically Signed: Dilip Irving MD  8/25/2024 2:15 PM EDT  Workstation ID: [id number]    CT Head Without Contrast Stroke Protocol    Result Date:  8/25/2024  CT HEAD WO CONTRAST STROKE PROTOCOL Date of Exam: 8/25/2024 1:35 PM EDT Indication: Neuro deficit, acute, stroke suspected Neuro Deficit, acute, Stroke suspected. Comparison: None available. Technique: Axial CT images were obtained of the head without contrast administration.  Reconstructed coronal images were also obtained. Automated exposure control and iterative construction methods were used. Scan Time: 1:35 p.m. Results discussed with stroke team at 143. Findings: Parenchyma:No acute intraparenchymal hemorrhage. No loss of gray-white differentiation to suggest large territory infarct. Mild parenchymal volume loss. Scattered periventricular and subcortical white matter hypodensities, nonspecific, but most often consistent with small vessel ischemic changes. No midline shift or herniation. Ventricles and extra axial spaces:Prominent ventricles and sulci secondary to volume loss. No extra axial fluid collection seen. Other:Lens replacements. Paranasal sinuses are clear. Mastoid air cells are clear. Calvarium is intact. Intracranial atherosclerotic calcification is present.     Impression: Impression: No evidence of acute intracranial hemorrhage or large territory infarct. Chronic changes as above. Electronically Signed: Mp Santos MD  8/25/2024 1:44 PM EDT  Workstation ID: [id number]         The patient has started, but not completed, their COVID-19 vaccination series.    Assessment & Plan   Assessment / Plan       TIA (transient ischemic attack)    History of coronary artery stent placement    Gout    Hyperlipidemia    Coronary artery disease involving native coronary artery of native heart without angina pectoris    Type 2 diabetes mellitus      Assessment & Plan:  89-year-old with history of hypertension, coronary artery disease with a stent, gout, hyperlipidemia, type 2 diabetes on insulin and obesity who presents with waking dysarthria and generalized weakness.    Dysarthria-resolved  MRI  pending  Continue antiplatelet and statin therapy  PT, OT, SLP consult  Follow neurologic exam    History of hypertension now with borderline low blood pressure  We will hold chronic nitrates for now, gently hydrate    Hyperlipidemia  Continue statin    History of coronary artery disease with 2 stents  -Data deficient-left heart cath [facility]    Gout  Continue allopurinol    CKD  -Stable    Mild increase in INR and low platelets  -Check ammonia, monitor for liver disease    Myoclonus  Suspect nocturnal hypoxia  Possibly undiagnosed sleep apnea and/or hypoventilation syndrome  Check room air oxygen while sleeping  Arrange for home oxygen if needed  -ABG with hypercarbia-- NEEDS sleep study, CPAP for now    Type 2 diabetes  -Patient on 70/30 at home  -hypoglycemic here, will add lantus if needed    Dementia with intermittent hallucinations  -PRN seroquel      VTE Prophylaxis:  Pharmacologic & mechanical VTE prophylaxis orders are present.    CODE STATUS:  Medical Intervention Limits: No intubation (DNI)  Code Status (Patient has no pulse and is not breathing): CPR (Attempt to Resuscitate)  Medical Interventions (Patient has pulse or is breathing): Limited Support    Expected Discharge  Expected Discharge Date: 8/27/2024; Expected Discharge Time:     Electronically signed by Viv Segura MD 08/25/24 19:42 EDT

## 2024-08-25 NOTE — ED PROVIDER NOTES
[place]    EMERGENCY DEPARTMENT ENCOUNTER      Pt Name: Gurdeep Burrows  MRN: [id number]  YOB: 1935  Date of evaluation: 8/25/2024  Provider: Wyatt Villanueva DO    CHIEF COMPLAINT       Chief Complaint   Patient presents with    Speech Problem         HISTORY OF PRESENT ILLNESS  (Location/Symptom, Timing/Onset, Context/Setting, Quality, Duration, Modifying Factors, Severity.)   Gurdeep Burrows is a 89 y.o. male who presents to the emergency department via EMS with a concern for weakness, slurred speech, questionable last known well as the patient was just recently discharged from [facility] rehab facility yesterday afternoon.  The patient's fine numbers noted yesterday sometime around 530 when I saw him after he went home that the patient's speech was slurred.  He was wheelchair-bound when they took the patient home from his rehab facility, weakness has been progressive thing per EMS.  They are unsure about his medical history of any blood thinners, believe he may take an aspirin.  In the paperwork they note they saw some type of cerebral infarction at some point.  There is no family to provide any additional history on patient's initial assessment.  The patient himself denies any chest pain, states he just feels generally weak, does not have any other acute complaints.      Nursing notes were reviewed.      PAST MEDICAL HISTORY   No past medical history on file.      SURGICAL HISTORY     No past surgical history on file.      CURRENT MEDICATIONS       Current Facility-Administered Medications:     sodium chloride 0.9 % flush 10 mL, 10 mL, Intravenous, PRN, Wyatt Villanueva DO  No current outpatient medications on file.    ALLERGIES     Patient has no known allergies.    FAMILY HISTORY     No family history on file.       SOCIAL HISTORY       Social History     Socioeconomic History    Marital status:          PHYSICAL EXAM    (up to 7 for level 4, 8 or more for level 5)  "    Vitals:    08/25/24 1403   BP: 107/71   BP Location: Right arm   Patient Position: Lying   Pulse: 75   Resp: 18   Temp: 97.6 °F (36.4 °C)   TempSrc: Oral   SpO2: 92%   Weight: 125 kg (275 lb)   Height: 172.7 cm (68\")       Physical Exam  General : Patient is awake, alert, answering questions with one-word responses, appears just generally weak, nontoxic-appearing  HEENT: Pupils are equally round, EOMI, conjunctivae clear  Cardiac: Heart regular rate, rhythm, no murmurs, rubs, or gallops  Lungs: Lungs are clear to auscultation  Abdomen: Abdomen is soft, nontender, nondistended.  Musculoskeletal: Patient is able to hold each limb off the bed individually against gravity, there is some myoclonic jerking of left upper and left lower extremity no focal muscle deficits are appreciated  Neuro: No flaccid paralysis, patient is slow with his speech, 1 or 2 word responses only, no flaccid paralysis, he does have some myoclonic jerking the left upper left lower extremity but no other focal neurological deficit.  Dermatology: Skin is warm and dry      DIAGNOSTIC RESULTS     EKG:  All EKGs are interpreted by the Emergency Department Physician who either signs or Co-signs this chart in the absence of a cardiologist.    ECG 12 Lead ED Triage Standing Order; Acute Stroke (Onset <24 hrs)   Final Result   Test Reason : ED Triage Standing Order~   Blood Pressure :   */*   mmHG   Vent. Rate :  75 BPM     Atrial Rate :  75 BPM      P-R Int : 232 ms          QRS Dur :  86 ms       QT Int : 440 ms       P-R-T Axes :  48  61  76 degrees      QTc Int : 491 ms      Sinus rhythm with 1st degree AV block   Low voltage QRS   Prolonged QT   Abnormal ECG   No previous ECGs available   Confirmed by VIRGINIA STAUFFER MD ([id number]) on 8/25/2024 2:32:15 PM      Referred By: EDMD           Confirmed By: VIRGINIA STAUFFER MD          RADIOLOGY:     [x] Radiologist's Report Reviewed:  XR Chest 1 View   Final Result   Impression: No acute cardiopulmonary " disease.         Electronically Signed: Dilip Irving MD     8/25/2024 2:15 PM EDT     Workstation ID: [id number]      CT Head Without Contrast Stroke Protocol   Final Result   Impression:   No evidence of acute intracranial hemorrhage or large territory infarct.      Chronic changes as above.            Electronically Signed: Mp Santos MD     8/25/2024 1:44 PM EDT     Workstation ID: [id number]          I ordered and independently reviewed the above noted radiographic studies.      I viewed images of CT head which showed no acute intracranial process per my independent interpretation.    See radiologist's dictation for official interpretation.      ED BEDSIDE ULTRASOUND:   Performed by ED Physician - none    LABS:    I have reviewed and interpreted all of the currently available lab results from this visit (if applicable):  Results for orders placed or performed during the hospital encounter of 08/25/24   COVID-19 and FLU A/B PCR, 1 HR TAT - Swab, Nasopharynx    Specimen: Nasopharynx; Swab   Result Value Ref Range    COVID19 Not Detected Not Detected - Ref. Range    Influenza A PCR Not Detected Not Detected    Influenza B PCR Not Detected Not Detected   Single High Sensitivity Troponin T    Specimen: Blood   Result Value Ref Range    HS Troponin T 34 (H) <22 ng/L   aPTT    Specimen: Blood   Result Value Ref Range    PTT 27.7 22.0 - 39.0 seconds   CBC Auto Differential    Specimen: Blood   Result Value Ref Range    WBC 9.70 3.40 - 10.80 10*3/mm3    RBC 3.99 (L) 4.14 - 5.80 10*6/mm3    Hemoglobin 12.8 (L) 13.0 - 17.7 g/dL    Hematocrit 40.5 37.5 - 51.0 %    .5 (H) 79.0 - 97.0 fL    MCH 32.1 26.6 - 33.0 pg    MCHC 31.6 31.5 - 35.7 g/dL    RDW 14.7 12.3 - 15.4 %    RDW-SD 55.4 (H) 37.0 - 54.0 fl    MPV 10.9 6.0 - 12.0 fL    Platelets 132 (L) 140 - 450 10*3/mm3    Neutrophil % 74.8 42.7 - 76.0 %    Lymphocyte % 12.9 (L) 19.6 - 45.3 %    Monocyte % 8.8 5.0 - 12.0 %    Eosinophil % 1.1 0.3 - 6.2 %    Basophil %  0.5 0.0 - 1.5 %    Immature Grans % 1.9 (H) 0.0 - 0.5 %    Neutrophils, Absolute 7.26 (H) 1.70 - 7.00 10*3/mm3    Lymphocytes, Absolute 1.25 0.70 - 3.10 10*3/mm3    Monocytes, Absolute 0.85 0.10 - 0.90 10*3/mm3    Eosinophils, Absolute 0.11 0.00 - 0.40 10*3/mm3    Basophils, Absolute 0.05 0.00 - 0.20 10*3/mm3    Immature Grans, Absolute 0.18 (H) 0.00 - 0.05 10*3/mm3    nRBC 0.0 0.0 - 0.2 /100 WBC   Comprehensive Metabolic Panel    Specimen: Blood   Result Value Ref Range    Glucose 116 (H) 65 - 99 mg/dL    BUN 25 (H) 8 - 23 mg/dL    Creatinine 1.42 (H) 0.76 - 1.27 mg/dL    Sodium 135 (L) 136 - 145 mmol/L    Potassium 4.8 3.5 - 5.2 mmol/L    Chloride 100 98 - 107 mmol/L    CO2 26.0 22.0 - 29.0 mmol/L    Calcium 8.5 (L) 8.6 - 10.5 mg/dL    Total Protein 6.8 6.0 - 8.5 g/dL    Albumin 3.6 3.5 - 5.2 g/dL    ALT (SGPT) 12 1 - 41 U/L    AST (SGOT) 20 1 - 40 U/L    Alkaline Phosphatase 84 39 - 117 U/L    Total Bilirubin 0.4 0.0 - 1.2 mg/dL    Globulin 3.2 gm/dL    A/G Ratio 1.1 g/dL    BUN/Creatinine Ratio 17.6 7.0 - 25.0    Anion Gap 9.0 5.0 - 15.0 mmol/L    eGFR 47.8 (L) >60.0 mL/min/1.73   Urinalysis With Microscopic If Indicated (No Culture) - Urine, Catheter    Specimen: Urine, Catheter   Result Value Ref Range    Color, UA Yellow Yellow, Straw    Appearance, UA Clear Clear    pH, UA <=5.0 5.0 - 8.0    Specific Gravity, UA 1.020 1.001 - 1.030    Glucose, UA >=1000 mg/dL (3+) (A) Negative    Ketones, UA Negative Negative    Bilirubin, UA Negative Negative    Blood, UA Negative Negative    Protein, UA Negative Negative    Leuk Esterase, UA Negative Negative    Nitrite, UA Negative Negative    Urobilinogen, UA 0.2 E.U./dL 0.2 - 1.0 E.U./dL   Lactic Acid, Plasma    Specimen: Blood   Result Value Ref Range    Lactate 1.1 0.5 - 2.0 mmol/L   Procalcitonin    Specimen: Blood   Result Value Ref Range    Procalcitonin 0.07 0.00 - 0.25 ng/mL   POC CHEM 8    Specimen: Blood   Result Value Ref Range    Glucose 115 70 - 130 mg/dL     BUN 25 8 - 26 mg/dL    Creatinine 1.60 (H) 0.60 - 1.30 mg/dL    Sodium 138 138 - 146 mmol/L    POC Potassium 4.7 3.5 - 4.9 mmol/L    Chloride 98 98 - 109 mmol/L    Total CO2 27 24 - 29 mmol/L    Hemoglobin 13.6 12.0 - 17.0 g/dL    Hematocrit 40 38 - 51 %    Ionized Calcium 1.15 (L) 1.20 - 1.32 mmol/L    eGFR 41.4 (L) >60.0 mL/min/1.73   POCT Protime/INR    Specimen: Blood   Result Value Ref Range    Protime 14.9 12.8 - 15.2 seconds    INR 1.3 (H) 0.8 - 1.2   ECG 12 Lead ED Triage Standing Order; Acute Stroke (Onset <24 hrs)   Result Value Ref Range    QT Interval 440 ms    QTC Interval 491 ms   Green Top (Gel)   Result Value Ref Range    Extra Tube Hold for add-ons.    Lavender Top   Result Value Ref Range    Extra Tube hold for add-on    Gold Top - SST   Result Value Ref Range    Extra Tube Hold for add-ons.    Gray Top   Result Value Ref Range    Extra Tube Hold for add-ons.    Light Blue Top   Result Value Ref Range    Extra Tube Hold for add-ons.         If labs were ordered, I independently reviewed the results and considered them in treating the patient.      EMERGENCY DEPARTMENT COURSE and DIFFERENTIAL DIAGNOSIS/MDM:   Vitals:  AS OF 16:18 EDT    BP - 107/71  HR - 75  TEMP - 97.6 °F (36.4 °C) (Oral)  O2 SATS - 92%      Orders placed during this visit:  Orders Placed This Encounter   Procedures    COVID PRE-OP / PRE-PROCEDURE SCREENING ORDER (NO ISOLATION) - Swab, Nasopharynx    Blood Culture - Blood,    Blood Culture - Blood,    COVID-19 and FLU A/B PCR, 1 HR TAT - Swab, Nasopharynx    CT Head Without Contrast Stroke Protocol    XR Chest 1 View    [place] Draw    Single High Sensitivity Troponin T    aPTT    CBC Auto Differential    Comprehensive Metabolic Panel    Urinalysis With Microscopic If Indicated (No Culture) - Urine, Catheter    Lactic Acid, Plasma    Procalcitonin    NPO Diet NPO Type: Strict NPO    Initiate Code Stroke    Perform NIH Stroke Scale    Measure Actual Weight    Head of Bed 30 Degrees  or Less    Undress and Gown    Continuous Pulse Oximetry    Vital Signs    Neuro Checks    Notify Provider SBP <80 or >200    Notify Provider for SBP > 140 if Hemorrhagic Stroke    No Hypotonic Fluids    Nursing Dysphagia Screening (Complete Prior to Giving anything PO)    RN to Place Order SLP Consult (IF swallow screen failed) - Eval & Treat Choosing Reason of RN Dysphagia Screen Failed    Inpatient Neurology Consult Stroke    Oxygen Therapy- Nasal Cannula; Titrate 1-6 LPM Per SpO2; 90 - 95%    POC CHEM 8    POCT Protime/INR    ECG 12 Lead ED Triage Standing Order; Acute Stroke (Onset <24 hrs)    Insert Large-Bore Peripheral IV - Right AC Preferred    Initiate Observation Status    ED Bed Request    CBC & Differential    Green Top (Gel)    Lavender Top    Gold Top - SST    Gray Top    Light Blue Top       All labs have been independently reviewed by me.  All radiology studies have been reviewed by me and the radiologist dictating the report.  All EKG's have been independently viewed and interpreted by me.      Discussion below represents my analysis of pertinent findings related to patient's condition, differential diagnosis, treatment plan and final disposition.    Differential diagnosis:  The differential diagnosis associated with the patient's presentation includes: CVA, TIA, seizure activity, generalized weakness, failure to thrive in adult, dehydration, electrolyte normalities, sepsis, UTI    Additional sources  Discussed/ obtained information from independent historians:   [] Spouse  [] Parent  [] Family member  [] Friend  [x] EMS   [] Other:    External (non-ED) record review:   [] Inpatient record:   [] Office record:   [] Outpatient record:   [] Prior Outpatient labs:   [] Prior Outpatient radiology:   [] Primary Care record:   [] Outside ED record:   [] Other:     Patient's care impacted by:   [] Diabetes  [] Hypertension  [] CHF  [] Hyperlipidemia  [] Coronary Artery Disease   [] COPD   [] Cancer   []  Tobacco Abuse   [] Substance Abuse    [] Other:     Care significantly affected by Social Determinants of Health (housing and economic circumstances, unemployment)    [] Yes     [x] No   If yes, Patient's care significantly limited by Social Determinants of Health including:   [] Inadequate housing   [] Low income   [] Alcoholism and drug addiction in family   [] Problems related to primary support group   [] Unemployment   [] Problems related to employment   [] Other Social Determinants of Health:       MEDICATIONS ADMINISTERED IN ED:  Medications   sodium chloride 0.9 % flush 10 mL (has no administration in time range)              This is a 89-year-old male who presents as a code stroke via EMS [facility] with a concern for slurred speech, generalized weakness last known well is unknown as when the patient was discharged from this facility the family noted he seemed like his speech was slurred around 530 this afternoon.  He is initially seen by myself and stroke team upon arrival stat CT head does not reveal any acute intracranial normality, patient's last known well is unknown.  Blood work and labs without acute infectious etiology, patient has mild renal insufficiency, creatinine 1.42, no signs of acute infectious change with a normal procalcitonin, stable lactic acid, white count 9.7 within normal hemoglobin.  Stroke team is following, patient last known well was potentially 1 to 2 days ago, likely necessitate MRI for further evaluation, will plan for admission to the hospitalist the patient will likely need additional rehab therapies, possible placement.  Case discussed with hospitalist, Dr. Rodriguez.    PROCEDURES:  Procedures    CRITICAL CARE TIME    Total Critical Care time was 35 minutes, excluding separately reportable procedures.  Patient with acute neurological deficit, slurred speech, stroke workup required multiple neurological assessments, reevaluations, discussions with multiple consultants. There  was a high probability of clinically significant/life threatening deterioration in the patient's condition which required my urgent intervention.      FINAL IMPRESSION      1. Slurred speech    2. Generalized weakness    3. Acute renal insufficiency          DISPOSITION/PLAN     ED Disposition       ED Disposition   Decision to Admit    Condition   --    Comment   Level of Care: Telemetry [5]   Diagnosis: TIA (transient ischemic attack) [651178]                   Comment: Please note this report has been produced using speech recognition software.      Wyatt Villanueva DO  Attending Emergency Physician         Wyatt Villanueva,   08/25/24 1618       Wyatt Villanueva,   08/25/24 1618

## 2024-08-25 NOTE — ED NOTES
" Gurdeep Burrows    Nursing Report ED to Floor:  Mental status: a/o x 4  Ambulatory status: has  not ambulated here, was at home alone  Oxygen Therapy:  ra  Cardiac Rhythm: nsr  Admitted from: home/ed  Safety Concerns:  none  Social Issues: pt recently left nursing home due to cost  ED Room #:  29      ED Nurse Phone Extension - [phone] or may call [phone].      HPI:   Chief Complaint   Patient presents with    Speech Problem       Past Medical History:  No past medical history on file.     Past Surgical History:  No past surgical history on file.     Admitting Doctor:   Mala Rodriguez MD    Consulting Provider(s):  Consults       Date and Time Order Name Status Description    8/25/2024  1:34 PM Inpatient Neurology Consult Stroke               Admitting Diagnosis:   The primary encounter diagnosis was Slurred speech. Diagnoses of Generalized weakness and Acute renal insufficiency were also pertinent to this visit.    Most Recent Vitals:   Vitals:    08/25/24 1403   BP: 107/71   BP Location: Right arm   Patient Position: Lying   Pulse: 75   Resp: 18   Temp: 97.6 °F (36.4 °C)   TempSrc: Oral   SpO2: 92%   Weight: 125 kg (275 lb)   Height: 172.7 cm (68\")       Active LDAs/IV Access:   Lines, Drains & Airways       Active LDAs       Name Placement date Placement time Site Days    Peripheral IV Anterior;Distal;Right;Upper Arm --  --  Arm  --    Peripheral IV 08/25/24 1344 Left Antecubital 08/25/24  1344  Antecubital  less than 1                    Labs (abnormal labs have a star):   Labs Reviewed   SINGLE HS TROPONIN T - Abnormal; Notable for the following components:       Result Value    HS Troponin T 34 (*)     All other components within normal limits    Narrative:     High Sensitive Troponin T Reference Range:  <14.0 ng/L- Negative Female for AMI  <22.0 ng/L- Negative Male for AMI  >=14 - Abnormal Female indicating possible myocardial injury.  >=22 - Abnormal Male indicating possible myocardial injury.   Clinicians would have " to utilize clinical acumen, EKG, Troponin, and serial changes to determine if it is an Acute Myocardial Infarction or myocardial injury due to an underlying chronic condition.        CBC WITH AUTO DIFFERENTIAL - Abnormal; Notable for the following components:    RBC 3.99 (*)     Hemoglobin 12.8 (*)     .5 (*)     RDW-SD 55.4 (*)     Platelets 132 (*)     Lymphocyte % 12.9 (*)     Immature Grans % 1.9 (*)     Neutrophils, Absolute 7.26 (*)     Immature Grans, Absolute 0.18 (*)     All other components within normal limits   COMPREHENSIVE METABOLIC PANEL - Abnormal; Notable for the following components:    Glucose 116 (*)     BUN 25 (*)     Creatinine 1.42 (*)     Sodium 135 (*)     Calcium 8.5 (*)     eGFR 47.8 (*)     All other components within normal limits    Narrative:     GFR Normal >60  Chronic Kidney Disease <60  Kidney Failure <15    The GFR formula is only valid for adults with stable renal function between ages 18 and 70.   URINALYSIS W/ MICROSCOPIC IF INDICATED (NO CULTURE) - Abnormal; Notable for the following components:    Glucose, UA >=1000 mg/dL (3+) (*)     All other components within normal limits    Narrative:     Urine microscopic not indicated.   POCT CHEM 8 - Abnormal; Notable for the following components:    Creatinine 1.60 (*)     Ionized Calcium 1.15 (*)     eGFR 41.4 (*)     All other components within normal limits   POCT PROTIME - INR - Abnormal; Notable for the following components:    INR 1.3 (*)     All other components within normal limits   COVID-19 AND FLU A/B, NP SWAB IN TRANSPORT MEDIA 1 HR TAT - Normal    Narrative:     Fact sheet for providers: https://www.fda.gov/media/046866/download    Fact sheet for patients: https://www.fda.gov/media/089740/download    Test performed by PCR.   APTT - Normal    Narrative:     PTT = The equivalent PTT values for the therapeutic range of heparin levels at 0.3 to 0.5 U/ml are 60 to 70 seconds.   LACTIC ACID, PLASMA - Normal  "  PROCALCITONIN - Normal    Narrative:     As a Marker for Sepsis (Non-Neonates):    1. <0.5 ng/mL represents a low risk of severe sepsis and/or septic shock.  2. >2 ng/mL represents a high risk of severe sepsis and/or septic shock.    As a Marker for Lower Respiratory Tract Infections that require antibiotic therapy:    PCT on Admission    Antibiotic Therapy       6-12 Hrs later    >0.5                Strongly Recommended  >0.25 - <0.5        Recommended   0.1 - 0.25          Discouraged              Remeasure/reassess PCT  <0.1                Strongly Discouraged     Remeasure/reassess PCT    As 28 day mortality risk marker: \"Change in Procalcitonin Result\" (>80% or <=80%) if Day 0 (or Day 1) and Day 4 values are available. Refer to http://www.Comply7sdaysoftpct-calculator.com    Change in PCT <=80%  A decrease of PCT levels below or equal to 80% defines a positive change in PCT test result representing a higher risk for 28-day all-cause mortality of patients diagnosed with severe sepsis for septic shock.    Change in PCT >80%  A decrease of PCT levels of more than 80% defines a negative change in PCT result representing a lower risk for 28-day all-cause mortality of patients diagnosed with severe sepsis or septic shock.      COVID PRE-OP / PRE-PROCEDURE SCREENING ORDER (NO ISOLATION)    Narrative:     The following orders were created for panel order COVID PRE-OP / PRE-PROCEDURE SCREENING ORDER (NO ISOLATION) - Swab, Nasopharynx.  Procedure                               Abnormality         Status                     ---------                               -----------         ------                     COVID-19 and FLU A/B PCR...[[id number]]  Normal              Final result                 Please view results for these tests on the individual orders.   BLOOD CULTURE   BLOOD CULTURE   RAINBOW DRAW    Narrative:     The following orders were created for panel order [place] Draw.  Procedure                               " Abnormality         Status                     ---------                               -----------         ------                     Green Top (Gel)[[id number]]                                  Final result               Lavender Top[[id number]]                                     Final result               Gold Top - SST[[id number]]                                   Final result               Meyer Top[[id number]]                                         Final result               Light Blue Top[[id number]]                                   Final result                 Please view results for these tests on the individual orders.   CBC AND DIFFERENTIAL    Narrative:     The following orders were created for panel order CBC & Differential.  Procedure                               Abnormality         Status                     ---------                               -----------         ------                     CBC Auto Differential[[id number]]        Abnormal            Final result               Scan Slide[[id number]]                                                                    Please view results for these tests on the individual orders.   GREEN TOP   LAVENDER TOP   GOLD TOP - SST   GRAY TOP   LIGHT BLUE TOP       Meds Given in ED:   Medications   sodium chloride 0.9 % flush 10 mL (has no administration in time range)           Last NIH score:  Interval: baseline  1a. Level of Consciousness: 0-->Alert, keenly responsive  1b. LOC Questions: 0-->Answers both questions correctly  1c. LOC Commands: 0-->Performs both tasks correctly  2. Best Gaze: 0-->Normal  3. Visual: 0-->No visual loss  4. Facial Palsy: 0-->Normal symmetrical movements  5a. Motor Arm, Left: 1-->Drift, limb holds 90 (or 45) degrees, but drifts down before full 10 seconds, does not hit bed or other support  5b. Motor Arm, Right: 0-->No drift, limb holds 90 (or 45) degrees for full 10 secs  6a. Motor Leg, Left: 0-->No drift, leg holds 30 degree position  for full 5 secs  6b. Motor Leg, Right: 0-->No drift, leg holds 30 degree position for full 5 secs  7. Limb Ataxia: 0-->Absent                      Dysphagia screening results:        Russell Coma Scale:  No data recorded     CIWA:        Restraint Type:            Isolation Status:  No active isolations

## 2024-08-26 ENCOUNTER — APPOINTMENT (OUTPATIENT)
Dept: CARDIOLOGY | Facility: HOSPITAL | Age: 89
End: 2024-08-26
Payer: MEDICARE

## 2024-08-26 ENCOUNTER — APPOINTMENT (OUTPATIENT)
Dept: MRI IMAGING | Facility: HOSPITAL | Age: 89
End: 2024-08-26
Payer: MEDICARE

## 2024-08-26 ENCOUNTER — READMISSION MANAGEMENT (OUTPATIENT)
Dept: CALL CENTER | Facility: HOSPITAL | Age: 89
End: 2024-08-26
Payer: MEDICARE

## 2024-08-26 VITALS
TEMPERATURE: 98 F | OXYGEN SATURATION: 96 % | BODY MASS INDEX: 41.77 KG/M2 | HEIGHT: 68 IN | DIASTOLIC BLOOD PRESSURE: 53 MMHG | WEIGHT: 275.57 LBS | SYSTOLIC BLOOD PRESSURE: 105 MMHG | RESPIRATION RATE: 18 BRPM | HEART RATE: 71 BPM

## 2024-08-26 PROBLEM — I05.0 MILD MITRAL STENOSIS: Status: ACTIVE | Noted: 2024-08-26

## 2024-08-26 PROBLEM — I50.43 ACUTE ON CHRONIC COMBINED SYSTOLIC AND DIASTOLIC CHF (CONGESTIVE HEART FAILURE): Status: ACTIVE | Noted: 2024-08-26

## 2024-08-26 PROBLEM — R47.9 TRANSIENT SPEECH DISTURBANCE: Status: ACTIVE | Noted: 2024-08-26

## 2024-08-26 LAB
AMMONIA BLD-SCNC: 19 UMOL/L (ref 16–60)
ARTERIAL PATENCY WRIST A: POSITIVE
ASCENDING AORTA: 3.6 CM
ATMOSPHERIC PRESS: ABNORMAL MM[HG]
BACTERIA BLD CULT: ABNORMAL
BASE EXCESS BLDA CALC-SCNC: 2.7 MMOL/L (ref 0–2)
BDY SITE: ABNORMAL
BH CV ECHO MEAS - AO MAX PG: 9.3 MMHG
BH CV ECHO MEAS - AO ROOT DIAM: 4.1 CM
BH CV ECHO MEAS - AO V2 MAX: 152.5 CM/SEC
BH CV ECHO MEAS - EF(MOD-BP): 50.8 %
BH CV ECHO MEAS - IVS/LVPW: 1 CM
BH CV ECHO MEAS - IVSD: 1.2 CM
BH CV ECHO MEAS - LA DIMENSION: 3.1 CM
BH CV ECHO MEAS - LAT PEAK E' VEL: 4.3 CM/SEC
BH CV ECHO MEAS - LV MAX PG: 3.2 MMHG
BH CV ECHO MEAS - LV MEAN PG: 1.73 MMHG
BH CV ECHO MEAS - LV V1 MAX: 89.2 CM/SEC
BH CV ECHO MEAS - LV V1 VTI: 21.2 CM
BH CV ECHO MEAS - LVIDD: 3.5 CM
BH CV ECHO MEAS - LVIDS: 2.4 CM
BH CV ECHO MEAS - LVOT DIAM: 2.1 CM
BH CV ECHO MEAS - LVPWD: 1.2 CM
BH CV ECHO MEAS - MED PEAK E' VEL: 3.1 CM/SEC
BH CV ECHO MEAS - MV E MAX VEL: 111.4 CM/SEC
BH CV ECHO MEAS - MV MAX PG: 11.7 MMHG
BH CV ECHO MEAS - MV MEAN PG: 5.4 MMHG
BH CV ECHO MEAS - MV P1/2T: 97 MSEC
BH CV ECHO MEAS - PA ACC TIME: 0.06 SEC
BH CV ECHO MEAS - RAP SYSTOLE: 3 MMHG
BH CV ECHO MEAS - TAPSE (>1.6): 1.65 CM
BH CV ECHO MEASUREMENTS AVERAGE E/E' RATIO: 30.11
BH CV XLRA - RV BASE: 3.1 CM
BH CV XLRA - TDI S': 9.4 CM/SEC
BODY TEMPERATURE: 37
BOTTLE TYPE: ABNORMAL
CHOLEST SERPL-MCNC: 154 MG/DL (ref 0–200)
CO2 BLDA-SCNC: 30.9 MMOL/L (ref 22–33)
COHGB MFR BLD: 0.7 % (ref 0–2)
EPAP: 0
GLUCOSE BLDC GLUCOMTR-MCNC: 147 MG/DL (ref 70–130)
GLUCOSE BLDC GLUCOMTR-MCNC: 160 MG/DL (ref 70–130)
GLUCOSE BLDC GLUCOMTR-MCNC: 168 MG/DL (ref 70–130)
GLUCOSE BLDC GLUCOMTR-MCNC: 68 MG/DL (ref 70–130)
HBA1C MFR BLD: 7.5 % (ref 4.8–5.6)
HCO3 BLDA-SCNC: 29.3 MMOL/L (ref 20–26)
HCT VFR BLD CALC: 36.1 % (ref 38–51)
HDLC SERPL-MCNC: 32 MG/DL (ref 40–60)
HGB BLDA-MCNC: 11.8 G/DL (ref 13.5–17.5)
INHALED O2 CONCENTRATION: 28 %
IPAP: 0
LDLC SERPL CALC-MCNC: 79 MG/DL (ref 0–100)
LDLC/HDLC SERPL: 2.17 {RATIO}
LEFT ATRIUM VOLUME INDEX: 23.3 ML/M2
METHGB BLD QL: 0.1 % (ref 0–1.5)
MODALITY: ABNORMAL
OXYHGB MFR BLDV: 93.2 % (ref 94–99)
PAW @ PEAK INSP FLOW SETTING VENT: 0 CMH2O
PCO2 BLDA: 52.9 MM HG (ref 35–45)
PCO2 TEMP ADJ BLD: 52.9 MM HG (ref 35–48)
PH BLDA: 7.35 PH UNITS (ref 7.35–7.45)
PH, TEMP CORRECTED: 7.35 PH UNITS
PO2 BLDA: 77.5 MM HG (ref 83–108)
PO2 TEMP ADJ BLD: 77.5 MM HG (ref 83–108)
TOTAL RATE: 0 BREATHS/MINUTE
TRIGL SERPL-MCNC: 263 MG/DL (ref 0–150)
VENTILATOR MODE: ABNORMAL
VLDLC SERPL-MCNC: 43 MG/DL (ref 5–40)

## 2024-08-26 PROCEDURE — 83036 HEMOGLOBIN GLYCOSYLATED A1C: CPT | Performed by: NURSE PRACTITIONER

## 2024-08-26 PROCEDURE — 99239 HOSP IP/OBS DSCHRG MGMT >30: CPT | Performed by: INTERNAL MEDICINE

## 2024-08-26 PROCEDURE — 93306 TTE W/DOPPLER COMPLETE: CPT

## 2024-08-26 PROCEDURE — G0378 HOSPITAL OBSERVATION PER HR: HCPCS

## 2024-08-26 PROCEDURE — 92523 SPEECH SOUND LANG COMPREHEN: CPT

## 2024-08-26 PROCEDURE — 82375 ASSAY CARBOXYHB QUANT: CPT

## 2024-08-26 PROCEDURE — 97165 OT EVAL LOW COMPLEX 30 MIN: CPT

## 2024-08-26 PROCEDURE — 25010000002 SULFUR HEXAFLUORIDE MICROSPH 60.7-25 MG RECONSTITUTED SUSPENSION: Performed by: NURSE PRACTITIONER

## 2024-08-26 PROCEDURE — 82948 REAGENT STRIP/BLOOD GLUCOSE: CPT

## 2024-08-26 PROCEDURE — 82140 ASSAY OF AMMONIA: CPT | Performed by: INTERNAL MEDICINE

## 2024-08-26 PROCEDURE — 36600 WITHDRAWAL OF ARTERIAL BLOOD: CPT

## 2024-08-26 PROCEDURE — 70551 MRI BRAIN STEM W/O DYE: CPT

## 2024-08-26 PROCEDURE — 93306 TTE W/DOPPLER COMPLETE: CPT | Performed by: INTERNAL MEDICINE

## 2024-08-26 PROCEDURE — 80061 LIPID PANEL: CPT | Performed by: NURSE PRACTITIONER

## 2024-08-26 PROCEDURE — 92610 EVALUATE SWALLOWING FUNCTION: CPT

## 2024-08-26 PROCEDURE — 97161 PT EVAL LOW COMPLEX 20 MIN: CPT

## 2024-08-26 PROCEDURE — 96374 THER/PROPH/DIAG INJ IV PUSH: CPT

## 2024-08-26 PROCEDURE — 99214 OFFICE O/P EST MOD 30 MIN: CPT | Performed by: STUDENT IN AN ORGANIZED HEALTH CARE EDUCATION/TRAINING PROGRAM

## 2024-08-26 PROCEDURE — 83050 HGB METHEMOGLOBIN QUAN: CPT

## 2024-08-26 PROCEDURE — 82805 BLOOD GASES W/O2 SATURATION: CPT

## 2024-08-26 RX ORDER — ENOXAPARIN SODIUM 100 MG/ML
40 INJECTION SUBCUTANEOUS EVERY 12 HOURS
Status: DISCONTINUED | OUTPATIENT
Start: 2024-08-26 | End: 2024-08-26 | Stop reason: HOSPADM

## 2024-08-26 RX ADMIN — DEXTROSE MONOHYDRATE 25 G: 25 INJECTION, SOLUTION INTRAVENOUS at 07:46

## 2024-08-26 RX ADMIN — METOPROLOL SUCCINATE 25 MG: 25 TABLET, EXTENDED RELEASE ORAL at 09:08

## 2024-08-26 RX ADMIN — SENNOSIDES AND DOCUSATE SODIUM 2 TABLET: 50; 8.6 TABLET ORAL at 09:08

## 2024-08-26 RX ADMIN — ALLOPURINOL 300 MG: 300 TABLET ORAL at 09:08

## 2024-08-26 RX ADMIN — RISPERIDONE 0.5 MG: 0.25 TABLET ORAL at 09:08

## 2024-08-26 RX ADMIN — ASPIRIN 81 MG 81 MG: 81 TABLET ORAL at 09:08

## 2024-08-26 RX ADMIN — SULFUR HEXAFLUORIDE 3 ML: KIT at 10:47

## 2024-08-26 RX ADMIN — CYANOCOBALAMIN TAB 1000 MCG 1000 MCG: 1000 TAB at 09:08

## 2024-08-26 RX ADMIN — ISOSORBIDE MONONITRATE 30 MG: 30 TABLET, EXTENDED RELEASE ORAL at 09:08

## 2024-08-26 RX ADMIN — Medication 10 ML: at 09:09

## 2024-08-26 RX ADMIN — EMPAGLIFLOZIN 10 MG: 10 TABLET, FILM COATED ORAL at 09:08

## 2024-08-26 NOTE — CASE MANAGEMENT/SOCIAL WORK
Continued Stay Note  [facility]     Patient Name: Gurdeep Burrows  MRN: [id number]  Today's Date: 8/26/2024    Admit Date: 8/25/2024    Plan: IRF   Discharge Plan       Row Name 08/26/24 1613       Plan    Plan IRF    Patient/Family in Agreement with Plan yes    Plan Comments A referral was called to Varinder, Liaison with [facility]. CM will continue to follow.    Final Discharge Disposition Code 62 - inpatient rehab facility      Row Name 08/26/24 9605       Plan    Plan Home    Patient/Family in Agreement with Plan yes    Plan Comments Met with Mr. Burrows and his daughter-in-law in his room, to initiate discharge planning. He lives alones in [place] [facility]. He verified he has Medicare and [employer] Insurance. Mr. Burrows has prescription coverage and uses [facility] in [place] to get his prescriptions filled. His PCP is MACARIO Lockhart. Prior to admission he required assistance with ADL's. He is able to feed himself. He has a ramp, rollator, rolling walker, wheelchair and a shower chair at home. He is not current with home health. He said he recently was discharged from [facility] and they were going to set up home health for him, until he got admitted to the hospital. His plan at discharge is to go home. Family will transport him. Await therapy recommendations to determine proper discharge placement or plan. CM will continue to follow.    Final Discharge Disposition Code 01 - home or self-care                   Discharge Codes    No documentation.                 Expected Discharge Date and Time       Expected Discharge Date Expected Discharge Time    Aug 27, 2024               Tuyet Calvert RN

## 2024-08-26 NOTE — DISCHARGE SUMMARY
[facility] Medicine Services  DISCHARGE SUMMARY    Patient Name: Gurdeep Burrows  : 1935  MRN: [id number]    Date of Admission: 2024  1:34 PM  Date of Discharge:  2024  Primary Care Physician: Aliza Manuel PA    Consults       Date and Time Order Name Status Description    2024  1:34 PM Inpatient Neurology Consult Stroke Completed             Hospital Course     Presenting Problem: Speech difficulty and generalized weakness    Active Hospital Problems    Diagnosis  POA   • **Transient speech disturbance [R47.9]  Yes   • Acute on chronic combined systolic and diastolic CHF (congestive heart failure) [I50.43]  Yes   • Mild mitral stenosis [I05.0]  Yes   • Coronary artery disease involving native coronary artery of native heart without angina pectoris [I25.10]  Yes   • Type 2 diabetes mellitus [E11.9]  Yes   • History of coronary artery stent placement [Z95.5]  Not Applicable   • Hyperlipidemia [E78.5]  Yes   • Gout [M10.9]  Yes      Resolved Hospital Problems   No resolved problems to display.          Hospital Course:  Gurdeep Burrows is a 89 y.o. male with hypertension, coronary artery disease with a stent, gout, hyperlipidemia, type 2 diabetes on insulin and obesity who presented with waking dysarthria and generalized weakness.     This patient's problems and plans were partially entered by my partner and updated as appropriate by me 24.      Dysarthria-resolved  -MRI no acute ischemic findings  -Continue ASA and statin therapy  -PT, OT, SLP assessed.  Patient was able to ambulate down hallway with therapy.  Recommendation was for rehab which he considered briefly but opted to return home with assist and home health since he just completed rehab last week.  -Seen by stroke team, suspect more metabolic in nature.  -Suspicion that hypoxia may be playing a role.  Referral made to sleep medicine clinic.  Patient had been wearing continuous O2 during recent  hospitalization at [place], at rehab and here.  Given his CHF and hypoxia, home O2 arranged at discharge as well.     Hyperlipidemia  Combined systolic/diastolic CHF  CAD  -Continue statin  -Follow up with primary cardiologist  -Continue home medications at discharge      Mild increase in INR and low platelets  -F/U with PCP as outpatient for further workup     Tremor  -Follow up with neurology as planned after recent hospitalization at [place]    Positive blood culture-suspected contaminant.  Non aureus staph in one set, other no growth at 24h.  Consistent with contamination.     Discharge Follow Up Recommendations for outpatient labs/diagnostics:  F/U PCP 1 week  F/U primary cardiologist 1 month  F/U Outpatient neurology as arranged during recent hospitalization at [place]    Day of Discharge     HPI:   He is feeling well today.  Says he was able to walk without difficulty.    Vital Signs:   Temp:  [97.7 °F (36.5 °C)-98.1 °F (36.7 °C)] 98 °F (36.7 °C)  Heart Rate:  [68-82] 71  Resp:  [16-20] 18  BP: (105-127)/(53-77) 105/53  Flow (L/min):  [2] 2      Physical Exam:  Constitutional: No acute distress, awake, alert, sitting up in chair  HENT: NCAT, mucous membranes moist  Respiratory: Clear to auscultation bilaterally, respiratory effort normal   Cardiovascular: RRR, no murmurs, rubs, or gallops  Musculoskeletal: No bilateral ankle edema  Psychiatric: Appropriate affect, cooperative  Neurologic: Speech clear and fluent  Skin: No rashes on exposed surfaces    Pertinent  and/or Most Recent Results     LAB RESULTS:      Lab 08/25/24  1347 08/25/24  1344 08/25/24  1343   WBC 9.70  --   --    HEMOGLOBIN 12.8*  --   --    HEMOGLOBIN, POC  --  13.6  --    HEMATOCRIT 40.5  --   --    HEMATOCRIT POC  --  40  --    PLATELETS 132*  --   --    NEUTROS ABS 7.26*  --   --    IMMATURE GRANS (ABS) 0.18*  --   --    LYMPHS ABS 1.25  --   --    MONOS ABS 0.85  --   --    EOS ABS 0.11  --   --    .5*  --   --    PROCALCITONIN  0.07  --   --    LACTATE 1.1  --   --    PROTIME  --   --  14.9   APTT 27.7  --   --          Lab 08/26/24  0635 08/25/24  1347 08/25/24  1344   SODIUM  --  135*  --    POTASSIUM  --  4.8  --    CHLORIDE  --  100  --    CO2  --  26.0  --    ANION GAP  --  9.0  --    BUN  --  25*  --    CREATININE  --  1.42* 1.60*   EGFR  --  47.8* 41.4*   GLUCOSE  --  116*  --    CALCIUM  --  8.5*  --    HEMOGLOBIN A1C 7.50*  --   --    TSH  --  0.951  --          Lab 08/25/24  1347   TOTAL PROTEIN 6.8   ALBUMIN 3.6   GLOBULIN 3.2   ALT (SGPT) 12   AST (SGOT) 20   BILIRUBIN 0.4   ALK PHOS 84         Lab 08/25/24  1347 08/25/24  1343   HSTROP T 34*  --    PROTIME  --  14.9   INR  --  1.3*         Lab 08/26/24  0635   CHOLESTEROL 154   LDL CHOL 79   HDL CHOL 32*   TRIGLYCERIDES 263*             Lab 08/26/24  0403 08/25/24  2202   PH, ARTERIAL 7.352 7.403   PCO2, ARTERIAL 52.9* 50.7*   PO2 ART 77.5* 48.1*   FIO2 28 21   HCO3 ART 29.3* 31.6*   BASE EXCESS ART 2.7* 5.6*   CARBOXYHEMOGLOBIN 0.7 0.8     Brief Urine Lab Results  (Last result in the past 365 days)        Color   Clarity   Blood   Leuk Est   Nitrite   Protein   CREAT   Urine HCG        08/25/24 1524 Yellow   Clear   Negative   Negative   Negative   Negative                 Microbiology Results (last 10 days)       Procedure Component Value - Date/Time    Blood Culture - Blood, Arm, Left [[id number]]  (Abnormal) Collected: 08/25/24 1439    Lab Status: Preliminary result Specimen: Blood from Arm, Left Updated: 08/26/24 1637     Blood Culture Abnormal Stain     Gram Stain Anaerobic Bottle Gram positive cocci in pairs and clusters      Aerobic Bottle Gram positive cocci in pairs and clusters    Blood Culture ID, PCR - Blood, Arm, Left [[id number]]  (Abnormal) Collected: 08/25/24 1439    Lab Status: Final result Specimen: Blood from Arm, Left Updated: 08/26/24 1638     BCID, PCR Staph spp, not aureus or lugdunensis. Identification by BCID2 PCR.     BOTTLE TYPE Anaerobic Bottle     Blood Culture - Blood, Wrist, Right [[id number]]  (Normal) Collected: 08/25/24 1421    Lab Status: Preliminary result Specimen: Blood from Wrist, Right Updated: 08/26/24 1500     Blood Culture No growth at 24 hours    COVID PRE-OP / PRE-PROCEDURE SCREENING ORDER (NO ISOLATION) - Swab, Nasopharynx [[id number]]  (Normal) Collected: 08/25/24 1415    Lab Status: Final result Specimen: Swab from Nasopharynx Updated: 08/25/24 1458    Narrative:      The following orders were created for panel order COVID PRE-OP / PRE-PROCEDURE SCREENING ORDER (NO ISOLATION) - Swab, Nasopharynx.  Procedure                               Abnormality         Status                     ---------                               -----------         ------                     COVID-19 and FLU A/B PCR...[[id number]]  Normal              Final result                 Please view results for these tests on the individual orders.    COVID-19 and FLU A/B PCR, 1 HR TAT - Swab, Nasopharynx [[id number]]  (Normal) Collected: 08/25/24 1415    Lab Status: Final result Specimen: Swab from Nasopharynx Updated: 08/25/24 1458     COVID19 Not Detected     Influenza A PCR Not Detected     Influenza B PCR Not Detected    Narrative:      Fact sheet for providers: https://www.fda.gov/media/125999/download    Fact sheet for patients: https://www.fda.gov/media/210985/download    Test performed by PCR.            Adult Transthoracic Echo Complete W/ Cont if Necessary Per Protocol (With Agitated Saline)    Result Date: 8/26/2024  •  Left ventricular systolic function is low normal. Calculated left ventricular EF = 50.8% •  Left ventricular wall thickness is consistent with mild concentric hypertrophy. •  The following left ventricular wall segments are hypokinetic: mid inferior, apical septal and mid inferoseptal. •  Left ventricular diastolic function is consistent with (grade Ia w/high LAP) impaired relaxation. •  There is calcification of the aortic valve. The aortic valve  appears trileaflet. No hemodynamically significant aortic valve stenosis is present. •  There is calcification of the mitral valve. No significant mitral valve regurgitation is present. Mild mitral valve stenosis is present. The mitral valve mean gradient is 5.40 mmHg. •  The tricuspid valve is structurally normal with no significant regurgitation or significant stenosis present.     MRI Brain Without Contrast    Result Date: 8/26/2024  MRI BRAIN WO CONTRAST Date of Exam: 8/26/2024 4:21 AM EDT Indication: Stroke, follow up TIA/Stroke work up.  Comparison: None available. Technique:  Routine multiplanar/multisequence sequence images of the brain were obtained without contrast administration. Findings: Diffusion-weighted imaging demonstrates no acute restriction abnormality. Midline structures of the brain are grossly unremarkable in appearance. Pituitary and sella structures and craniocervical junction are grossly unremarkable in appearance. There is diffuse atrophy. No suspicious extra-axial fluid collections are noted. There is no acute intracranial hemorrhage or mass effect. Moderate patchy and confluent FLAIR and T2 signal hyperintensity with a supratentorial predominance is most compatible with small vessel ischemic change in this age group. Mild changes also noted within the naomy and midbrain. The major intracranial flow voids appear patent. Cerebral pontine angle structures and inner ear structures are grossly unremarkable. Small left-sided mastoid effusion is noted. Paranasal sinuses demonstrate minimal mucosal thickening. Globes and orbits demonstrate thinning of the lenses and otherwise grossly unremarkable     Impression: 1. No acute ischemic change 2. Chronic white matter changes compatible small vessel ischemic disease in this age group. Electronically Signed: David Villegas MD  8/26/2024 6:53 AM EDT  Workstation ID: [id number]    XR Chest 1 View    Result Date: 8/25/2024  XR CHEST 1 VW Date of Exam:  8/25/2024 1:35 PM EDT Indication: Acute Stroke Protocol (onset < 12 hrs) Comparison: None available. Findings: No focal consolidation. No pneumothorax or pleural effusion. Cardiac size is normal. The visualized clavicles appear intact. No displaced rib fractures. The visualized upper abdomen is normal.     Impression: No acute cardiopulmonary disease. Electronically Signed: Dilip Irving MD  8/25/2024 2:15 PM EDT  Workstation ID: [id number]    CT Head Without Contrast Stroke Protocol    Result Date: 8/25/2024  CT HEAD WO CONTRAST STROKE PROTOCOL Date of Exam: 8/25/2024 1:35 PM EDT Indication: Neuro deficit, acute, stroke suspected Neuro Deficit, acute, Stroke suspected. Comparison: None available. Technique: Axial CT images were obtained of the head without contrast administration.  Reconstructed coronal images were also obtained. Automated exposure control and iterative construction methods were used. Scan Time: 1:35 p.m. Results discussed with stroke team at [facility]. Findings: Parenchyma:No acute intraparenchymal hemorrhage. No loss of gray-white differentiation to suggest large territory infarct. Mild parenchymal volume loss. Scattered periventricular and subcortical white matter hypodensities, nonspecific, but most often consistent with small vessel ischemic changes. No midline shift or herniation. Ventricles and extra axial spaces:Prominent ventricles and sulci secondary to volume loss. No extra axial fluid collection seen. Other:Lens replacements. Paranasal sinuses are clear. Mastoid air cells are clear. Calvarium is intact. Intracranial atherosclerotic calcification is present.     Impression: No evidence of acute intracranial hemorrhage or large territory infarct. Chronic changes as above. Electronically Signed: Mp Santos MD  8/25/2024 1:44 PM EDT  Workstation ID: [id number]             Results for orders placed during the hospital encounter of 08/25/24    Adult Transthoracic Echo Complete W/ Cont if  Necessary Per Protocol (With Agitated Saline)    Interpretation Summary  •  Left ventricular systolic function is low normal. Calculated left ventricular EF = 50.8%  •  Left ventricular wall thickness is consistent with mild concentric hypertrophy.  •  The following left ventricular wall segments are hypokinetic: mid inferior, apical septal and mid inferoseptal.  •  Left ventricular diastolic function is consistent with (grade Ia w/high LAP) impaired relaxation.  •  There is calcification of the aortic valve. The aortic valve appears trileaflet. No hemodynamically significant aortic valve stenosis is present.  •  There is calcification of the mitral valve. No significant mitral valve regurgitation is present. Mild mitral valve stenosis is present. The mitral valve mean gradient is 5.40 mmHg.  •  The tricuspid valve is structurally normal with no significant regurgitation or significant stenosis present.      Plan for Follow-up of Pending Labs/Results: Will notify patient if further results warrant a change in management  Pending Labs       Order Current Status    Blood Culture - Blood, Arm, Left Preliminary result    Blood Culture - Blood, Wrist, Right Preliminary result          Discharge Details        Discharge Medications        Changes to Medications        Instructions Start Date   risperiDONE 1 MG tablet  Commonly known as: risperDAL  What changed: Another medication with the same name was removed. Continue taking this medication, and follow the directions you see here.   1 mg, Oral, Daily             Continue These Medications        Instructions Start Date   allopurinol 300 MG tablet  Commonly known as: ZYLOPRIM   300 mg, Oral, Daily      aspirin 81 MG EC tablet   81 mg, Oral, Daily      atorvastatin 80 MG tablet  Commonly known as: LIPITOR   80 mg, Oral, Daily      ferrous sulfate 325 (65 FE) MG tablet   325 mg, Oral, Daily With Breakfast      furosemide 20 MG tablet  Commonly known as: LASIX   20 mg,  Oral, 2 Times Daily      hydrOXYzine 25 MG tablet  Commonly known as: ATARAX   25 mg, Oral, 2 Times Daily PRN      insulin aspart prot-insulin aspart (70-30) 100 UNIT/ML injection  Commonly known as: novoLOG 70/30   33 Units, Subcutaneous, Daily      insulin aspart prot-insulin aspart (70-30) 100 UNIT/ML injection  Commonly known as: novoLOG 70/30   53 Units, Subcutaneous, Nightly      isosorbide mononitrate 30 MG 24 hr tablet  Commonly known as: IMDUR   30 mg, Oral, 2 Times Daily      Jardiance 10 MG tablet tablet  Generic drug: empagliflozin   10 mg, Oral, Daily      linaclotide 290 MCG capsule capsule  Commonly known as: LINZESS   145 mcg, Oral, Every Morning Before Breakfast      metoprolol succinate XL 25 MG 24 hr tablet  Commonly known as: TOPROL-XL   25 mg, Oral, Daily      omeprazole 20 MG capsule  Commonly known as: priLOSEC   20 mg, Oral, Daily      ranolazine 500 MG 12 hr tablet  Commonly known as: RANEXA   500 mg, Oral, 2 Times Daily      vitamin B-12 1000 MCG tablet  Commonly known as: CYANOCOBALAMIN   1,000 mcg, Oral, Daily      vitamin D 1.25 MG (94966 UT) capsule capsule  Commonly known as: ERGOCALCIFEROL   50,000 Units, Oral, Weekly, On Mondays             Stop These Medications      doxycycline 100 MG tablet  Commonly known as: VIBRAMYICN     Ertugliflozin L-PyroglutamicAc 15 MG tablet  Commonly known as: STEGLATRO              No Known Allergies      Discharge Disposition:  Home-Health Care [facility]    Diet:  Hospital:  Diet Order   Procedures   • Diet: Cardiac, Diabetic; Healthy Heart (2-3 Na+); Consistent Carbohydrate; Texture: Regular (IDDSI 7); Fluid Consistency: Thin (IDDSI 0)         CODE STATUS:    Code Status and Medical Interventions: CPR (Attempt to Resuscitate); Limited Support; No intubation (DNI)   Ordered at: 08/1935     Medical Intervention Limits:    No intubation (DNI)     Code Status (Patient has no pulse and is not breathing):    CPR (Attempt to Resuscitate)     Medical  Interventions (Patient has pulse or is breathing):    Limited Support       No future appointments.    Additional Instructions for the Follow-ups that You Need to Schedule       Ambulatory Referral to Sleep Medicine   As directed      Follow-up needed: Yes        Discharge Follow-up with PCP   As directed       Currently Documented PCP:    Aliza Manuel PA    PCP Phone Number:    [phone]     Follow Up Details: Within 1 week        Discharge Follow-up with Specified Provider: Patient's primary cardiologist; 1 Month   As directed      To: Patient's primary cardiologist   Follow Up: 1 Month                      Dominga Ag MD  08/26/24      Time Spent on Discharge:  I spent  39  minutes on this discharge activity which included: face-to-face encounter with the patient, reviewing the data in the system, coordination of the care with the nursing staff as well as consultants, documentation, and entering orders.

## 2024-08-26 NOTE — PROGRESS NOTES
Stroke Progress Note       Chief Complaint: Episode of transient slurred speech    Subjective    Subjective     Subjective:  The patient is sitting in a recliner in NAD.  The daughter-in-law was at the bedside. The patient stated that he is doing fine today.  He states that sometimes he will be having shaking/tremors of hands.  The daughter-in-law stated that yesterday he had 1 episode where he was slurring his speech.  She also stated that sometimes he will have twitching/tremors in the hands, together with problem walking.  The patient stated that he is at his normal self now.  Denies having any new stroke or strokelike symptoms.    No other acute complains at this time    Review of Systems   Constitutional: No fatigue  Eyes: Negative for redness.   Respiratory: Negative for cough.      Musculoskeletal: Negative for joint swelling.   Neurological: Negative for tremors.   Psychiatric/Behavioral: Negative for hallucinations.   Objective      Temp:  [97.7 °F (36.5 °C)-98.1 °F (36.7 °C)] 98 °F (36.7 °C)  Heart Rate:  [68-82] 71  Resp:  [16-20] 18  BP: (101-127)/(53-77) 105/53       Objective    GEN: Sitting in a recliner; in NAD  HENT: normocephalic, non-erythematous oropharynx  CV: no LE edema  PULM: non-labored breathing  EXT: no clubbing, cyanosis, or erythema  SKIN: no rashes or lesions    NEURO:  Mental Status: A&O x 3 (patient was able to name himself, mention the month and the year together with a day of the week and he was also able to name his daughter-in-law), interactive, able to follow commands  Speech: Intact Articulation  CN 2-12:  II - PERRLA, VFs full to confrontation  III, IV, VI - EOMI  V - Facial sensation intact  VII - Brow raise and smile symmetrical  VIII - Auditory acuity intact  IX, X - Palate elevation symmetric, uvula midline  XI - SCM and Trapezius strength intact  XII - Tongue protrudes midline    Motor: Normal muscle tone and bulk, mild cogwheel rigidity in the left upper extremity  together with resting tremor noted mostly on the left upper extremity.  RUE: 5/5  LUE: 5/5  RLE: 5/5  LLE: 5/5    Sensory: intact light touch throughout  Reflexes: 2+ throughout, negative Arriaza's sign BL  Coordination: no ataxia with finger-to-nose testing  Gait/Station: deferred     Results Review:    I reviewed the patient's new clinical results.    WBC   Date Value Ref Range Status   08/25/2024 9.70 3.40 - 10.80 10*3/mm3 Final     RBC   Date Value Ref Range Status   08/25/2024 3.99 (L) 4.14 - 5.80 10*6/mm3 Final     Hemoglobin   Date Value Ref Range Status   08/25/2024 12.8 (L) 13.0 - 17.7 g/dL Final   08/25/2024 13.6 12.0 - 17.0 g/dL Final     Comment:     Serial Number: [id number]:  [id number]     Hematocrit   Date Value Ref Range Status   08/25/2024 40.5 37.5 - 51.0 % Final   08/25/2024 40 38 - 51 % Final     MCV   Date Value Ref Range Status   08/25/2024 101.5 (H) 79.0 - 97.0 fL Final     MCH   Date Value Ref Range Status   08/25/2024 32.1 26.6 - 33.0 pg Final     MCHC   Date Value Ref Range Status   08/25/2024 31.6 31.5 - 35.7 g/dL Final     RDW   Date Value Ref Range Status   08/25/2024 14.7 12.3 - 15.4 % Final     RDW-SD   Date Value Ref Range Status   08/25/2024 55.4 (H) 37.0 - 54.0 fl Final     MPV   Date Value Ref Range Status   08/25/2024 10.9 6.0 - 12.0 fL Final     Platelets   Date Value Ref Range Status   08/25/2024 132 (L) 140 - 450 10*3/mm3 Final     Neutrophil %   Date Value Ref Range Status   08/25/2024 74.8 42.7 - 76.0 % Final     Lymphocyte %   Date Value Ref Range Status   08/25/2024 12.9 (L) 19.6 - 45.3 % Final     Monocyte %   Date Value Ref Range Status   08/25/2024 8.8 5.0 - 12.0 % Final     Eosinophil %   Date Value Ref Range Status   08/25/2024 1.1 0.3 - 6.2 % Final     Basophil %   Date Value Ref Range Status   08/25/2024 0.5 0.0 - 1.5 % Final     Immature Grans %   Date Value Ref Range Status   08/25/2024 1.9 (H) 0.0 - 0.5 % Final     Neutrophils, Absolute   Date Value Ref Range  Status   08/25/2024 7.26 (H) 1.70 - 7.00 10*3/mm3 Final     Lymphocytes, Absolute   Date Value Ref Range Status   08/25/2024 1.25 0.70 - 3.10 10*3/mm3 Final     Monocytes, Absolute   Date Value Ref Range Status   08/25/2024 0.85 0.10 - 0.90 10*3/mm3 Final     Eosinophils, Absolute   Date Value Ref Range Status   08/25/2024 0.11 0.00 - 0.40 10*3/mm3 Final     Basophils, Absolute   Date Value Ref Range Status   08/25/2024 0.05 0.00 - 0.20 10*3/mm3 Final     Immature Grans, Absolute   Date Value Ref Range Status   08/25/2024 0.18 (H) 0.00 - 0.05 10*3/mm3 Final     nRBC   Date Value Ref Range Status   08/25/2024 0.0 0.0 - 0.2 /100 WBC Final       Lab Results   Component Value Date    GLUCOSE 116 (H) 08/25/2024    BUN 25 (H) 08/25/2024    CREATININE 1.42 (H) 08/25/2024     (L) 08/25/2024    K 4.8 08/25/2024     08/25/2024    CALCIUM 8.5 (L) 08/25/2024    PROTEINTOT 6.8 08/25/2024    ALBUMIN 3.6 08/25/2024    ALT 12 08/25/2024    AST 20 08/25/2024    ALKPHOS 84 08/25/2024    BILITOT 0.4 08/25/2024    GLOB 3.2 08/25/2024    AGRATIO 1.1 08/25/2024    BCR 17.6 08/25/2024    ANIONGAP 9.0 08/25/2024    EGFR 47.8 (L) 08/25/2024     MRI Brain Without Contrast    Result Date: 8/26/2024  Impression: 1. No acute ischemic change 2. Chronic white matter changes compatible small vessel ischemic disease in this age group. Electronically Signed: David Villegas MD  8/26/2024 6:53 AM EDT  Workstation ID: [id number]    XR Chest 1 View    Result Date: 8/25/2024  Impression: No acute cardiopulmonary disease. Electronically Signed: Dilip Irving MD  8/25/2024 2:15 PM EDT  Workstation ID: [id number]    CT Head Without Contrast Stroke Protocol    Result Date: 8/25/2024  Impression: No evidence of acute intracranial hemorrhage or large territory infarct. Chronic changes as above. Electronically Signed: Mp Santos MD  8/25/2024 1:44 PM EDT  Workstation ID: [id number]   Results for orders placed during the hospital encounter of  08/25/24    Adult Transthoracic Echo Complete W/ Cont if Necessary Per Protocol (With Agitated Saline)    Interpretation Summary    Left ventricular systolic function is low normal. Calculated left ventricular EF = 50.8%    Left ventricular wall thickness is consistent with mild concentric hypertrophy.    The following left ventricular wall segments are hypokinetic: mid inferior, apical septal and mid inferoseptal.    Left ventricular diastolic function is consistent with (grade Ia w/high LAP) impaired relaxation.    There is calcification of the aortic valve. The aortic valve appears trileaflet. No hemodynamically significant aortic valve stenosis is present.    There is calcification of the mitral valve. No significant mitral valve regurgitation is present. Mild mitral valve stenosis is present. The mitral valve mean gradient is 5.40 mmHg.    The tricuspid valve is structurally normal with no significant regurgitation or significant stenosis present.    -MRI of the brain from August 26, 2024 images were personally reviewed and showed no evidence of acute ischemic stroke or hemorrhage.  -LDL level from August 26, 2024 was 79  -A1c level from August 26, 2024 was 7.5%  -Echocardiogram from August 26, 2024 report was personally reviewed and showed left ventricular ejection fraction of 50.8% and normal left atrium.  Bubble study was not performed due to the patient's age      Assessment/Plan   89-year-old male with vascular risk factors who presented [facility] emergency department via EMS for complaints of dysarthria as well as bilateral lower extremity weakness.  Not deemed to be an appropriate IV thrombolytic therapy candidate second to extended last known well.  Not deemed to be an appropriate emergent endovascular therapy candidate as any potential benefit would be outweighed by significant risk.     Antiplatelet PTA: Unknown  Coagulant PTA: Unknown        # Transient episode of bilateral lower  extremity weakness  # Transient episode of slurred speech  # Left upper extremity resting tremor  -Mechanism this is likely metabolic related to hypocalcemia and his hypoxia.  The left upper extremity resting tremors need to be followed in an outpatient setting if continues to get worse.  Very low concern for vascular event at this time given denying focality of symptoms together with a negative MRI of the brain.  -MRI of the brain from August 26, 2024 images were personally reviewed and showed no evidence of acute ischemic stroke or hemorrhage.  -LDL level from August 26, 2024 was 79  -A1c level from August 26, 2024 was 7.5%  -Echocardiogram from August 26, 2024 report was personally reviewed and showed left ventricular ejection fraction of 50.8% and normal left atrium.  Bubble study was not performed due to the patient's age    Recommendations:  -Continue aspirin 81 mg daily for secondary stroke prevention  -Continue Lipitor 40 mg nightly for secondary stroke prevention  -Normal BP goals  -Consider following up with an outpatient neurologist for the left upper extremity resting tremor.  -Further metabolic workup per hospital medicine team  -PT/OT/SLP  -Fall precautions  -Case Management assistance for discharge planning     Stroke will sign off. Please call for any further questions or concerns  =================================  Fahad Sandoval MD, Msc, PhD  Vascular Neurologist  [facility]

## 2024-08-26 NOTE — PLAN OF CARE
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

## 2024-08-26 NOTE — CASE MANAGEMENT/SOCIAL WORK
Discharge Planning Assessment  [facility]     Patient Name: Gurdeep Burrows  MRN: [id number]  Today's Date: 8/26/2024    Admit Date: 8/25/2024    Plan: Home   Discharge Needs Assessment       Row Name 08/26/24 1553       Living Environment    People in Home alone    Primary Care Provided by self    Provides Primary Care For no one, unable/limited ability to care for self    Family Caregiver if Needed child(marietta), adult;other relative(s)    Family Caregiver Names Bhanu Burrows  Son  [phone]  Jimmie(Poa) Markos  Son  [phone]  Aliza(Healthcare Surrogate) Markos   [phone]    Quality of Family Relationships helpful;involved;supportive    Able to Return to Prior Arrangements yes       Transition Planning    Patient/Family Anticipates Transition to home    Patient/Family Anticipated Services at Transition     Transportation Anticipated family or friend will provide       Discharge Needs Assessment    Equipment Currently Used at Home ramp;rollator;walker, rolling;wheelchair;shower chair                   Discharge Plan       Row Name 08/26/24 1558       Plan    Plan Home    Patient/Family in Agreement with Plan yes    Plan Comments Met with Mr. Burrows and his daughter-in-law in his room, to initiate discharge planning. He lives alones in [place] [facility] [place]. He verified he has Medicare and [employer]. Mr. Burrows has prescription coverage and uses [facility] in [place] to get his prescriptions filled. His PCP is MACARIO Lockhart. Prior to admission he required assistance with ADL's. He is able to feed himself. He has a ramp, rollator, rolling walker, wheelchair and a shower chair at home. He is not current with home health. He said he recently was discharged from [facility] and they were going to set up home health for him, until he got admitted to the hospital. His plan at discharge is to go home. Family will transport him. Await therapy recommendations to determine proper discharge  placement or plan. CM will continue to follow.    Final Discharge Disposition Code 01 - home or self-care                  Continued Care and Services - Admitted Since 8/25/2024       Durable Medical Equipment Coordination complete.      Service Provider Request Status Selected Services Address Phone Fax Patient Preferred    [facility]  Selected Oxygen Equipment and Accessories [address] [place] [id number] [phone] --                  Expected Discharge Date and Time       Expected Discharge Date Expected Discharge Time    Aug 27, 2024            Demographic Summary       Row Name 08/26/24 1552       General Information    Admission Type observation    Arrived From emergency department    Referral Source admission list    Reason for Consult discharge planning    Preferred Language English       Contact Information    Permission Granted to Share Info With     Contact Information Obtained for                    Functional Status       Row Name 08/26/24 1552       Functional Status    Usual Activity Tolerance fair    Current Activity Tolerance fair       Functional Status, IADL    Medications assistive person    Meal Preparation assistive person    Housekeeping assistive person    Laundry assistive person    Shopping assistive person                   Psychosocial    No documentation.                  Abuse/Neglect    No documentation.                  Legal    No documentation.                  Substance Abuse    No documentation.                  Patient Forms    No documentation.                     Tuyet Calvert RN

## 2024-08-26 NOTE — CONSULTS
"Diabetes Education  Assessment/Teaching    Patient Name:  Gurdeep Burrosw  YOB: 1935  MRN: [id number]  Admit Date:  8/25/2024      Assessment Date:  8/26/2024  Flowsheet Row Most Recent Value   General Information     Referral From: Other (comment)  [Per stroke protocol]   Height 172.7 cm (67.99\")   Height Method Estimated   Weight 125 kg (275 lb 9.2 oz)   Is patient pregnant? n/a   Pregnancy Assessment    Diabetes History    What type of diabetes do you have? Type 2   Length of Diabetes Diagnosis 10 + years   Current DM knowledge fair   Do you test your blood sugar at home? yes   Frequency of checks every 5 minutes   Meter type Dexcom   Who performs the test? self/daughter   Have you had low blood sugar? (<70mg/dl) yes   How often do you have low blood sugar? occasionally   Have you had high blood sugar? (>140mg/dl) no   How would you rate your diabetes control? good   Education Preferences    What areas of diabetes would you like to learn about? avoiding high blood sugar, avoiding low blood sugar, diabetes complications, diet information, medications for diabetes, testing my blood sugar at home, resources on diabetes, understanding diabetes   Barriers to Learning hearing loss   Nutrition Information    Assessment Topics    Healthy Eating - Assessment Needs education   Being Active - Assessment Needs education   Taking Medication - Assessment Needs education   Problem Solving - Assessment Needs education   Reducing Risk - Assessment Needs education   Healthy Coping - Assessment Needs education   Monitoring - Assessment Needs education   DM Goals             Flowsheet Row Most Recent Value   DM Education Needs    Meter Has own   Frequency of Testing --  [Dexcom CGM q 5 minutes]   Blood Glucose Target Range Target ranges per ADA guidelines   Medication Oral, Insulin   Problem Solving Hypoglycemia, Hyperglycemia, Sick days, Symptoms, Signs, Treatment   Reducing Risks A1C testing   Healthy Eating " "Reviewed meal plan   Physical Activity Frequency Discussed exercise importance   Healthy Coping Anxious   Motivation Moderate   Teaching Method Explanation, Discussion, Handouts   Patient Response Needs reinforcement          Total time spent reviewing chart, preparing education/materials, providing education at bedside, and coordinating care approx 15 minutes.    Consult for diabetes education received per stroke protocol. Chart reviewed. Mr. Burrows was seen at bedside today with daughter present. Permission given for visit. Mr. Burrows is [place].    Discussed and reviewed concepts about type 2 diabetes self-management, risk factors, and importance of blood glucose control to reduce complications. Target blood glucose readings and A1c goals per ADA were reviewed. Reviewed with patient current A1c 7.5 and discussed its significance. Signs, symptoms, and treatment of hyperglycemia and hypoglycemia were discussed.     Patient's daughter states that he has a Dexcom G6 on his arm currently and that his PCP receives the data from it.    Patient states that he has experienced hypoglycemia and typically drinks some orange juice. Reviewed the Rule of 15 for treating hypoglycemia. Patient and daughter verbalized understanding.     Patient's home medications include: Jardiance and Novolog 70/30. Patient's daughter states that they have no difficulties administering or acquiring his medications.    Lifestyle changes such as physical activity with MD approval and healthy eating were encouraged. Encouraged pt to monitor blood sugar at home purposefully 4  times per day and to call PCP if blood sugar is trending high. Encouraged to keep record of blood glucose readings to take to follow up appointment with PCP. Provided patient with copy of [employer]'s \"What is Diabetes\" handout, \"Blood Glucose Goals\" handout, \"What is A1c\" handout, and [employer]'s \"A Balanced Plate\" handout.     Patient does not qualify for the follow up stroke " class based on the exclusion criteria of  MRI negative for acute infarct.  Thank you for this consult.       Electronically signed by:  Kim Mccauley RN  08/26/24 12:13 EDT

## 2024-08-26 NOTE — THERAPY EVALUATION
Acute Care - Speech Language Pathology   Swallow Initial Evaluation [facility]  Clinical Swallow Evaluation  Cognitive-Communication Evaluation     Patient Name: Gurdeep Burrows  : 1935  MRN: [id number]  Today's Date: 2024               Admit Date: 2024    Visit Dx:     ICD-10-CM ICD-9-CM   1. Slurred speech  R47.81 784.59   2. Generalized weakness  R53.1 780.79   3. Acute renal insufficiency  N28.9 593.9   4. Cognitive communication deficit  R41.841 799.52     Patient Active Problem List   Diagnosis    TIA (transient ischemic attack)    History of coronary artery stent placement    Gout    Hyperlipidemia    Irritable bowel syndrome    Foraminal stenosis of lumbar region    Tremor    Coronary artery disease involving native coronary artery of native heart without angina pectoris    Type 2 diabetes mellitus     Past Medical History:   Diagnosis Date    CAD (coronary artery disease) 2023    [place] [facility] [place]     Diabetes     Hypertension     Obesity hypoventilation syndrome      Past Surgical History:   Procedure Laterality Date    BACK SURGERY      lumbar laminectomy?    CATARACT EXTRACTION      KNEE ARTHROPLASTY      LEFT HEART CATH      ORIF HIP FRACTURE         SLP Recommendation and Plan  SLP Swallowing Diagnosis: swallow WFL/no suspected pharyngeal impairment (24 08)  SLP Diet Recommendation: regular textures, thin liquids (24)     SLP Rec. for Method of Medication Administration: as tolerated (24)     Monitor for Signs of Aspiration: notify SLP if any concerns (24)     Swallow Criteria for Skilled Therapeutic Interventions Met: no problems identified which require skilled intervention (24)  Anticipated Discharge Disposition (SLP): home with assist (24)     Therapy Frequency (Swallow): evaluation only (24)  Predicted Duration Therapy Intervention (Days): 1 week  (08/26/24 0800)                                           Plan of Care Reviewed With: patient      SWALLOW EVALUATION (Last 72 Hours)       SLP Adult Swallow Evaluation       Row Name 08/26/24 0800                   Rehab Evaluation    Document Type evaluation  -SM        Subjective Information no complaints  -SM        Patient Observations alert;cooperative  -SM        Patient Effort good  -SM           General Information    Patient Profile Reviewed yes  -SM        Pertinent History Of Current Problem Frontotemporal dementia. Found by family with unintelligble speech. MRI negative.  -SM        Current Method of Nutrition NPO  -SM        Prior Level of Function-Swallowing safe, efficient swallowing in all situations  -SM        Plans/Goals Discussed with patient;agreed upon  -        Barriers to Rehab none identified  -        Patient's Goals for Discharge patient did not state  -SM           Pain    Additional Documentation Pain Scale: Numbers Pre/Post-Treatment (Group)  -SM           Pain Scale: Numbers Pre/Post-Treatment    Pretreatment Pain Rating 0/10 - no pain  -SM        Posttreatment Pain Rating 0/10 - no pain  -SM           Oral Musculature and Cranial Nerve Assessment    Oral Motor General Assessment generalized oral motor weakness  mild  -SM           Clinical Swallow Eval    Oral Prep Phase WFL  -SM        Oral Transit WFL  -SM        Oral Residue WFL  -SM        Pharyngeal Phase no overt signs/symptoms of pharyngeal impairment  -SM        Esophageal Phase unremarkable  -SM        Clinical Swallow Evaluation Summary Noted with baseline cough x1. Did not persist with PO trials.  -SM           Oral Prep Concerns    Oral Prep Concerns --  -SM           Pharyngeal Phase Concerns    Pharyngeal Phase Concerns --  -SM           SLP Evaluation Clinical Impression    SLP Swallowing Diagnosis swallow WFL/no suspected pharyngeal impairment  -SM        Swallow Criteria for Skilled Therapeutic Interventions Met  no problems identified which require skilled intervention  -           Recommendations    Therapy Frequency (Swallow) evaluation only  -        SLP Diet Recommendation regular textures;thin liquids  -        SLP Rec. for Method of Medication Administration as tolerated  -        Monitor for Signs of Aspiration notify SLP if any concerns  -                  User Key  (r) = Recorded By, (t) = Taken By, (c) = Cosigned By      Initials Name Effective Dates    Lilly Bryant MS CCC-SLP 02/03/23 -                     EDUCATION  The patient has been educated in the following areas:   Dysphagia (Swallowing Impairment).        SLP GOALS       Row Name 08/26/24 0800             Patient will demonstrate functional cognitive-linguistic skills for return to discharge environment    [place] with minimal cues  -      Time frame 1 week  -         SLP Diagnostic Treatment     Patient will participate in further assessment in the following areas clarification of baseline cognitive communication status  -      Time Frame (Diagnostic) 1 week  -                User Key  (r) = Recorded By, (t) = Taken By, (c) = Cosigned By      Initials Name Provider Type    Lilly Bryant MS CCC-SLP Speech and Language Pathologist                         Time Calculation:    Time Calculation- SLP       Row Name 08/26/24 0936             Time Calculation- SLP    SLP Start Time 0800  -      SLP Received On 08/26/24  -         Untimed Charges    18873-SY Eval Speech and Production w/ Language Minutes 25  -SM      63252-JP Eval Oral Pharyng Swallow Minutes 25  -SM         Total Minutes    Untimed Charges Total Minutes 50  -SM       Total Minutes 50  -SM                User Key  (r) = Recorded By, (t) = Taken By, (c) = Cosigned By      Initials Name Provider Type    Lilly Bryant MS CCC-SLP Speech and Language Pathologist                    Therapy Charges for Today       Code Description Service Date  Service Provider Modifiers Qty    65077353821 HC ST EVAL ORAL PHARYNG SWALLOW 2 2024 Lilly Dunlap, MS CCC-SLP GN 1    88624946793 HC ST EVAL SPEECH AND PROD W LANG  2 2024 Lilly Dunlap, MS CCC-SLP GN 1                 Lilly Dunlap, MS CCC-SLP  2024   and Acute Care - Speech Language Pathology Initial Evaluation  [facility]     Patient Name: Gurdeep Burrows  : 1935  MRN: [id number]  Today's Date: 2024               Admit Date: 2024     Visit Dx:    ICD-10-CM ICD-9-CM   1. Slurred speech  R47.81 784.59   2. Generalized weakness  R53.1 780.79   3. Acute renal insufficiency  N28.9 593.9   4. Cognitive communication deficit  R41.841 799.52     Patient Active Problem List   Diagnosis    TIA (transient ischemic attack)    History of coronary artery stent placement    Gout    Hyperlipidemia    Irritable bowel syndrome    Foraminal stenosis of lumbar region    Tremor    Coronary artery disease involving native coronary artery of native heart without angina pectoris    Type 2 diabetes mellitus     Past Medical History:   Diagnosis Date    CAD (coronary artery disease) 2023    [place] [facility] [place]ementia     Diabetes     Hypertension     Obesity hypoventilation syndrome      Past Surgical History:   Procedure Laterality Date    BACK SURGERY      lumbar laminectomy?    CATARACT EXTRACTION      KNEE ARTHROPLASTY      LEFT HEART CATH      ORIF HIP FRACTURE         SLP Recommendation and Plan  SLP Diagnosis: cognitive-linguistic disorder (? baseline) (24 08)        Monitor for Signs of Aspiration: notify SLP if any concerns (24)  Swallow Criteria for Skilled Therapeutic Interventions Met: no problems identified which require skilled intervention (24)  SLC Criteria for Skilled Therapy Interventions Met: yes (24)  Anticipated Discharge Disposition (SLP): home with assist (24)      Therapy Frequency (Swallow): evaluation only (08/26/24 0800)  Therapy Frequency (SLP SLC): 5 days per week (08/26/24 0800)  Predicted Duration Therapy Intervention (Days): 1 week (08/26/24 0800)                             Plan of Care Reviewed With: patient (08/26/24 0979)      SLP EVALUATION (Last 72 Hours)       SLP SLC Evaluation       Row Name 08/26/24 0800                   Comprehension Assessment/Intervention    Comprehension Assessment/Intervention Auditory Comprehension  -SM           Auditory Comprehension Assessment/Intervention    Answers Questions (Communication) yes/no;wh questions;personal;simple;concrete;mild impairment  -SM        Able to Follow Commands (Communication) 1-step;mild impairment  -SM        Narrative Discourse conversational level;mild impairment  -SM        Successful Auditory Strategies (Communication) repetition;visual cues  -SM        Auditory Comprehension Communication, Comment decreased hearing partially contributing. ? baseline  -SM           Expression Assessment/Intervention    Expression Assessment/Intervention verbal expression  -SM           Verbal Expression Assessment/Intervention    Conversational Discourse/Fluency WFL  -SM           Motor Speech Assessment/Intervention    Motor Speech Function WFL;mild impairment  -SM        Characteristics Consistent with Dysarthria decreased articulation  -SM        Speech intelligibility 100%;in connected speech;with unfamiliar listener  -SM        Motor Speech, Comment Min imprecision detected. Very well could be baseline for pt  -SM           Cursory Voice Assessment/Intervention    Quality and Resonance (Voice) WFL  -SM           Cognitive Assessment Intervention- SLP    Orientation Status (Cognition) person;place;time;WFL;situation;mild impairment  -SM        Attention (Cognitive) selective;WFL  -SM        Problem Solving (Cognitive) simple;mild impairment  cues needed  -SM        Cognition, Comment Baseline unknown. Pt  without complaints or concerns though not able to provide adequate insight. SLP will f/u when family present to discuss ? benefit of SLP services  -           SLP Evaluation Clinical Impressions    SLP Diagnosis cognitive-linguistic disorder  ? baseline  -        Rehab Potential/Prognosis good  -        SLC Criteria for Skilled Therapy Interventions Met yes  -        Functional Impact needs occasional supervision  -           Recommendations    Therapy Frequency (SLP SLC) 5 days per week  -        Predicted Duration Therapy Intervention (Days) 1 week  -        Anticipated Discharge Disposition (SLP) home with assist  -                  User Key  (r) = Recorded By, (t) = Taken By, (c) = Cosigned By      Initials Name Effective Dates    Lilly Bryant MS CCC-SLP 02/03/23 -                        EDUCATION  The patient has been educated in the following areas:     Cognitive Impairment Communication Impairment.           SLP GOALS       Row Name 08/26/24 0800             Patient will demonstrate functional cognitive-linguistic skills for return to discharge environment    [place] with minimal cues  -      Time frame 1 week  -         SLP Diagnostic Treatment     Patient will participate in further assessment in the following areas clarification of baseline cognitive communication status  -      Time Frame (Diagnostic) 1 week  -                User Key  (r) = Recorded By, (t) = Taken By, (c) = Cosigned By      Initials Name Provider Type    Lilly Bryant, MS CCC-SLP Speech and Language Pathologist                              Time Calculation:      Time Calculation- SLP       Row Name 08/26/24 0936             Time Calculation- SLP    SLP Start Time 0800  -      SLP Received On 08/26/24  -         Untimed Charges    [id number]-[id number] Eval Speech and Production w/ Language Minutes 25  -      81757-[id number] Eval Oral Pharyng Swallow Minutes 25  -         Total Minutes    Untimed  Charges Total Minutes 50  -SM       Total Minutes 50  -SM                User Key  (r) = Recorded By, (t) = Taken By, (c) = Cosigned By      Initials Name Provider Type    Lilly Bryant MS CCC-SLP Speech and Language Pathologist                    Therapy Charges for Today       Code Description Service Date Service Provider Modifiers Qty    22029335478 HC ST EVAL ORAL PHARYNG SWALLOW 2 8/26/2024 Lilly Dunlap MS CCC-SLP GN 1    22361520444 HC ST EVAL SPEECH AND PROD W LANG  2 8/26/2024 Lilly Dunlap MS CCC-SLP GN 1                       Lilly Dunlap MS CCC-SLP  8/26/2024

## 2024-08-26 NOTE — PLAN OF CARE
Problem: Adult Inpatient Plan of Care  Goal: Plan of Care Review  Outcome: Ongoing, Progressing  Flowsheets (Taken 8/26/2024 0443)  Progress: improving  Plan of Care Reviewed With: patient  Goal: Patient-Specific Goal (Individualized)  Outcome: Ongoing, Progressing  Goal: Absence of Hospital-Acquired Illness or Injury  Outcome: Ongoing, Progressing  Intervention: Identify and Manage Fall Risk  Recent Flowsheet Documentation  Taken 8/26/2024 0442 by Yesy Santizo RN  Safety Promotion/Fall Prevention:   activity supervised   assistive device/personal items within reach   safety round/check completed  Taken 8/26/2024 0230 by Yesy Santizo RN  Safety Promotion/Fall Prevention:   activity supervised   assistive device/personal items within reach   safety round/check completed  Taken 8/26/2024 0038 by Yesy Santizo RN  Safety Promotion/Fall Prevention:   activity supervised   assistive device/personal items within reach   safety round/check completed  Taken 8/25/2024 2245 by Yesy Santizo RN  Safety Promotion/Fall Prevention:   activity supervised   assistive device/personal items within reach   safety round/check completed  Taken 8/25/2024 2001 by Yesy Santizo RN  Safety Promotion/Fall Prevention:   activity supervised   assistive device/personal items within reach   clutter free environment maintained   fall prevention program maintained   lighting adjusted   muscle strengthening facilitated   nonskid shoes/slippers when out of bed   room organization consistent   safety round/check completed  Intervention: Prevent Skin Injury  Recent Flowsheet Documentation  Taken 8/26/2024 0442 by Yesy Santizo RN  Body Position: weight shifting  Taken 8/26/2024 0230 by Yesy Santizo RN  Body Position: weight shifting  Taken 8/26/2024 0038 by Yesy Santizo RN  Body Position:   weight shifting   tilted   right  Taken 8/25/2024 2245 by Yesy Santizo RN  Body Position:   tilted   right  Taken 8/25/2024 2001  by Yesy Santizo RN  Body Position:   supine   weight shifting  Intervention: Prevent and Manage VTE (Venous Thromboembolism) Risk  Recent Flowsheet Documentation  Taken 8/26/2024 0442 by Yesy Santizo RN  Activity Management: activity encouraged  VTE Prevention/Management: other (see comments)  Taken 8/26/2024 0230 by Yesy Santizo RN  Activity Management: activity encouraged  VTE Prevention/Management: other (see comments)  Taken 8/26/2024 0038 by Yesy Santizo RN  Activity Management: activity encouraged  VTE Prevention/Management: other (see comments)  Taken 8/25/2024 2245 by Yesy Santizo RN  Activity Management: activity encouraged  VTE Prevention/Management: other (see comments)  Taken 8/25/2024 2001 by Yesy Santizo RN  Activity Management: activity encouraged  VTE Prevention/Management: (see MAR) other (see comments)  Range of Motion: active ROM (range of motion) encouraged  Intervention: Prevent Infection  Recent Flowsheet Documentation  Taken 8/26/2024 0442 by Yesy Santizo RN  Infection Prevention:   hand hygiene promoted   rest/sleep promoted  Taken 8/26/2024 0230 by Yesy Santizo RN  Infection Prevention:   hand hygiene promoted   rest/sleep promoted  Taken 8/26/2024 0038 by Yesy Santizo RN  Infection Prevention:   hand hygiene promoted   rest/sleep promoted  Taken 8/25/2024 2245 by Yesy Santizo RN  Infection Prevention:   hand hygiene promoted   rest/sleep promoted  Taken 8/25/2024 2001 by Yesy Santizo RN  Infection Prevention:   hand hygiene promoted   rest/sleep promoted  Goal: Optimal Comfort and Wellbeing  Outcome: Ongoing, Progressing  Intervention: Provide Person-Centered Care  Recent Flowsheet Documentation  Taken 8/26/2024 0442 by Yesy Santizo RN  Trust Relationship/Rapport: care explained  Taken 8/26/2024 0230 by Yesy Santizo RN  Trust Relationship/Rapport: care explained  Taken 8/26/2024 0038 by Yesy Santizo RN  Trust Relationship/Rapport: care  explained  Taken 8/25/2024 2245 by Yesy Santizo RN  Trust Relationship/Rapport: care explained  Taken 8/25/2024 2001 by Yesy Santizo RN  Trust Relationship/Rapport:   care explained   choices provided   questions answered   questions encouraged   reassurance provided  Goal: Readiness for Transition of Care  Outcome: Ongoing, Progressing     Problem: Fall Injury Risk  Goal: Absence of Fall and Fall-Related Injury  Outcome: Ongoing, Progressing  Intervention: Identify and Manage Contributors  Recent Flowsheet Documentation  Taken 8/26/2024 0442 by Yesy Santizo RN  Self-Care Promotion: independence encouraged  Taken 8/26/2024 0230 by Yesy Santizo RN  Self-Care Promotion: independence encouraged  Taken 8/26/2024 0038 by Yesy Santizo RN  Self-Care Promotion: independence encouraged  Taken 8/25/2024 2001 by Yesy Santizo RN  Medication Review/Management: medications reviewed  Intervention: Promote Injury-Free Environment  Recent Flowsheet Documentation  Taken 8/26/2024 0442 by Yesy Santizo RN  Safety Promotion/Fall Prevention:   activity supervised   assistive device/personal items within reach   safety round/check completed  Taken 8/26/2024 0230 by Yesy Santizo RN  Safety Promotion/Fall Prevention:   activity supervised   assistive device/personal items within reach   safety round/check completed  Taken 8/26/2024 0038 by Yesy Santizo RN  Safety Promotion/Fall Prevention:   activity supervised   assistive device/personal items within reach   safety round/check completed  Taken 8/25/2024 2245 by Yesy Santizo RN  Safety Promotion/Fall Prevention:   activity supervised   assistive device/personal items within reach   safety round/check completed  Taken 8/25/2024 2001 by Yesy Santizo RN  Safety Promotion/Fall Prevention:   activity supervised   assistive device/personal items within reach   clutter free environment maintained   fall prevention program maintained   lighting adjusted    muscle strengthening facilitated   nonskid shoes/slippers when out of bed   room organization consistent   safety round/check completed   Goal Outcome Evaluation:  Plan of Care Reviewed With: patient      Pt currently in bed resting quietly. No complaints of pain or discomfort at this time. Pt states his strength has improved since admission. Speech garbled but able to understand. Dysphagia screen failed, pt NPO. NIH of 1 this shift. Neuro checks WNL. VSS. IV fluids administered as ordered. Plans for consultations today. MRI completed. No other observations at this time, call bell in reach.  Progress: improving

## 2024-08-26 NOTE — THERAPY EVALUATION
Patient Name: Gurdeep Burrows  : 1935    MRN: [id number]                              Today's Date: 2024       Admit Date: 2024    Visit Dx:     ICD-10-CM ICD-9-CM   1. Slurred speech  R47.81 784.59   2. Generalized weakness  R53.1 780.79   3. Acute renal insufficiency  N28.9 593.9   4. Cognitive communication deficit  R41.841 799.52   5. Acute on chronic combined systolic and diastolic CHF (congestive heart failure)  I50.43 428.43     428.0     Patient Active Problem List   Diagnosis    TIA (transient ischemic attack)    History of coronary artery stent placement    Gout    Hyperlipidemia    Irritable bowel syndrome    Foraminal stenosis of lumbar region    Tremor    Coronary artery disease involving native coronary artery of native heart without angina pectoris    Type 2 diabetes mellitus    Acute on chronic combined systolic and diastolic CHF (congestive heart failure)    Mild mitral stenosis     Past Medical History:   Diagnosis Date    CAD (coronary artery disease) 2023    RCA occlusions, LHC done with stents placed at [facility] [place]    Dementia     Diabetes     Hypertension     Obesity hypoventilation syndrome      Past Surgical History:   Procedure Laterality Date    BACK SURGERY      lumbar laminectomy?    CATARACT EXTRACTION      KNEE ARTHROPLASTY      LEFT HEART CATH      ORIF HIP FRACTURE        General Information       Row Name 24 1309          Physical Therapy Time and Intention    Document Type evaluation  -SS     Mode of Treatment physical therapy  -       Row Name 24 1309          General Information    Patient Profile Reviewed yes  -SS     Prior Level of Function independent:;all household mobility;gait;transfer;w/c or scooter  pt reports use of FWW for short distance ambulation or use of manual WC; able to transfer to/from  w/o assist; declines any acute falls; discharged 2 days ago from SNF  -SS     Existing Precautions/Restrictions fall;oxygen therapy device and  L/min  -     Barriers to Rehab previous functional deficit;medically complex  -       Row Name 08/26/24 1309          Living Environment    People in Home alone  -       Row Name 08/26/24 1309          Home Main Entrance    Number of Stairs, Main Entrance other (see comments)  ramp  -       Row Name 08/26/24 1309          Stairs Within Home, Primary    Number of Stairs, Within Home, Primary other (see comments)  basement; does not need to navigate  -       Row Name 08/26/24 1309          Cognition    Orientation Status (Cognition) oriented x 3  -       Row Name 08/26/24 1309          Safety Issues, Functional Mobility    Safety Issues Affecting Function (Mobility) awareness of need for assistance;insight into deficits/self-awareness;positioning of assistive device;safety precaution awareness;safety precautions follow-through/compliance;sequencing abilities  -     Impairments Affecting Function (Mobility) balance;endurance/activity tolerance;postural/trunk control;range of motion (ROM);strength  -               User Key  (r) = Recorded By, (t) = Taken By, (c) = Cosigned By      Initials Name Provider Type     Josefina Vasques, PT Physical Therapist                   Mobility       Row Name 08/26/24 1314          Bed Mobility    Bed Mobility scooting/bridging;supine-sit  -     Scooting/Bridging [place] (Bed Mobility) verbal cues;moderate assist (50% patient effort)  -     Supine-Sit [place] (Bed Mobility) verbal cues;moderate assist (50% patient effort)  -     Assistive Device (Bed Mobility) bed rails;head of bed elevated  -     Comment, (Bed Mobility) VC for sequencing; pt. asymptomatic  -       Row Name 08/26/24 1314          Sit-Stand Transfer    Sit-Stand [place] (Transfers) minimum assist (75% patient effort);verbal cues  -     Assistive Device (Sit-Stand Transfers) walker, front-wheeled  -     Comment, (Sit-Stand Transfer) VC for hand placement, appropriate alignment,  lowering with eccentric control  -       Row Name 08/26/24 1314          Gait/Stairs (Locomotion)    [place] Level (Gait) minimum assist (75% patient effort);1 person assist;1 person to manage equipment;verbal cues  -     Assistive Device (Gait) walker, front-wheeled  -     Patient was able to Ambulate yes  -     Distance in Feet (Gait) 60  -SS     Deviations/Abnormal Patterns (Gait) bilateral deviations;base of support, wide;gamal decreased;gait speed decreased;stride length decreased;weight shifting decreased  -     Bilateral Gait Deviations forward flexed posture;heel strike decreased;foot drop/toe drag  -     Comment, (Gait/Stairs) Pt. ambulated with a step through gait pattern w/forward flexed posture w/B foot drop (L more severe than R - reports recently aquiring B AFO while at SNF rehab) . VC for upright posture, AD management. Activity limited by fatigue.  -               User Key  (r) = Recorded By, (t) = Taken By, (c) = Cosigned By      Initials Name Provider Type     Josefina Vasques, PT Physical Therapist                   Obj/Interventions       Row Name 08/26/24 1318          Range of Motion Comprehensive    General Range of Motion bilateral lower extremity ROM WFL  -       Row Name 08/26/24 1318          Strength Comprehensive (MMT)    Comment, General Manual Muscle Testing (MMT) Assessment BLE gross 4-/5, symmetrical w/exception of L DF gross 1/5, R DF gross 2/5  -       Row Name 08/26/24 1318          Motor Skills    Motor Skills coordination  -     Coordination WFL;bilateral;lower extremity  -       Row Name 08/26/24 1318          Balance    Balance Assessment sitting static balance;sitting dynamic balance;sit to stand dynamic balance;standing static balance;standing dynamic balance  -     Static Sitting Balance standby assist  -     Dynamic Sitting Balance contact guard  -SS     Position, Sitting Balance unsupported;sitting edge of bed  -     Sit to Stand Dynamic  Balance minimal assist  -SS     Static Standing Balance contact guard  -SS     Dynamic Standing Balance minimal assist  -SS     Position/Device Used, Standing Balance supported;walker, front-wheeled  -SS     Balance Interventions sitting;standing;sit to stand;supported;static;dynamic  -       Row Name 08/26/24 1318          Sensory Assessment (Somatosensory)    Sensory Assessment (Somatosensory) LE sensation intact  -SS               User Key  (r) = Recorded By, (t) = Taken By, (c) = Cosigned By      Initials Name Provider Type    SS Josefina Vasques, PT Physical Therapist                   Goals/Plan       Row Name 08/26/24 1326          Bed Mobility Goal 1 (PT)    Activity/Assistive Device (Bed Mobility Goal 1, PT) bed mobility activities, all  -SS     [place] Level/Cues Needed (Bed Mobility Goal 1, PT) independent  -SS     Time Frame (Bed Mobility Goal 1, PT) short term goal (STG);5 days  -[facility] Name 08/26/24 1326          Transfer Goal 1 (PT)    Activity/Assistive Device (Transfer Goal 1, PT) sit-to-stand/stand-to-sit;bed-to-chair/chair-to-bed;walker, rolling  -SS     [place] Level/Cues Needed (Transfer Goal 1, PT) modified independence  -SS     Time Frame (Transfer Goal 1, PT) long term goal (LTG);10 days  -[facility] Name 08/26/24 1326          Gait Training Goal 1 (PT)    Activity/Assistive Device (Gait Training Goal 1, PT) gait (walking locomotion);assistive device use;walker, rolling  -SS     [place] Level (Gait Training Goal 1, PT) modified independence  -SS     Distance (Gait Training Goal 1, PT) 150  -SS     Time Frame (Gait Training Goal 1, PT) long term goal (LTG);10 days  -       Row Name 08/26/24 1326          Therapy Assessment/Plan (PT)    Planned Therapy Interventions (PT) balance training;bed mobility training;home exercise program;gait training;neuromuscular re-education;patient/family education;postural re-education;motor coordination training;ROM (range of  motion);strengthening;stretching;transfer training;wheelchair management/propulsion training  -               User Key  (r) = Recorded By, (t) = Taken By, (c) = Cosigned By      Initials Name Provider Type     Josefina Vasques, PT Physical Therapist                   Clinical Impression       Row Name 08/26/24 1312          Pain    Pretreatment Pain Rating 0/10 - no pain  Simultaneous filing. User may be unaware of other data.  -SS     Posttreatment Pain Rating 0/10 - no pain  Simultaneous filing. User may be unaware of other data.  -     Pain Intervention(s) Repositioned;Ambulation/increased activity;Elevated  -     Additional Documentation Pain Scale: Numbers Pre/Post-Treatment (Group)  -       Row Name 08/26/24 1317          Plan of Care Review    Plan of Care Reviewed With patient  -     Outcome Evaluation Pt. presents below baseline function w/generalized weakness, balance deficits and decreased functional endurance affecting his ability to safely participate in functional mobility. He performed bed mobility, transfers and ambulated 60' w/front wheeled walker, min assist. Activity limited by fatigue. Pt. would benefit from acute PT services to address stated deficits.  -       Row Name 08/26/24 1311          Therapy Assessment/Plan (PT)    Patient/Family Therapy Goals Statement (PT) to return to PLOF  -     Rehab Potential (PT) good, to achieve stated therapy goals  -     Criteria for Skilled Interventions Met (PT) yes;meets criteria;skilled treatment is necessary  -     Therapy Frequency (PT) daily  -     Predicted Duration of Therapy Intervention (PT) 10 days  -       Row Name 08/26/24 1319          Vital Signs    Pre Systolic BP Rehab 107  -SS     Pre Treatment Diastolic BP 77  -SS     Post Systolic BP Rehab 113  -SS     Post Treatment Diastolic BP 65  -SS     Pretreatment Heart Rate (beats/min) 73  -SS     Posttreatment Heart Rate (beats/min) 72  -SS     Pre SpO2 (%) 96  -SS     O2  Delivery Pre Treatment nasal cannula  2.5L  -SS     Intra SpO2 (%) 89  -SS     O2 Delivery Intra Treatment room air  -SS     Post SpO2 (%) 96  -SS     O2 Delivery Post Treatment nasal cannula  2.5L  -SS     Pre Patient Position Supine  -SS     Intra Patient Position Sitting  -SS     Post Patient Position Sitting  -SS       Row Name 08/26/24 1319          Positioning and Restraints    Pre-Treatment Position in bed  -SS     Post Treatment Position chair  -SS     In Chair notified nsg;reclined;call light within reach;encouraged to call for assist;exit alarm on;with family/caregiver;waffle cushion;on mechanical lift sling;legs elevated  -SS               User Key  (r) = Recorded By, (t) = Taken By, (c) = Cosigned By      Initials Name Provider Type    Josefina Perez, DENITA Physical Therapist                   Outcome Measures       Row Name 08/26/24 1327 08/26/24 0800       How much help from another person do you currently need...    Turning from your back to your side while in flat bed without using bedrails? 3  -SS 3  -JM    Moving from lying on back to sitting on the side of a flat bed without bedrails? 3  -SS 3  -JM    Moving to and from a bed to a chair (including a wheelchair)? 3  -SS 2  -JM    Standing up from a chair using your arms (e.g., wheelchair, bedside chair)? 3  -SS 2  -JM    Climbing 3-5 steps with a railing? 2  -SS 2  -JM    To walk in hospital room? 3  -SS 2  -JM    AM-PAC 6 Clicks Score (PT) 17  -SS 14  -JM    Highest Level of Mobility Goal 5 --> Static standing  -SS 4 --> Transfer to chair/commode  -      Row Name 08/26/24 1327 08/26/24 1324       Modified Ripley Scale    Pre-Stroke Modified Emmanuel Scale 6 - Unable to determine (UTD) from the medical record documentation  -SS 6 - Unable to determine (UTD) from the medical record documentation  -LC    Modified Ripley Scale 4 - Moderately severe disability.  Unable to walk without assistance, and unable to attend to own bodily needs without  assistance.  - 4 - Moderately severe disability.  Unable to walk without assistance, and unable to attend to own bodily needs without assistance.  -      Row Name 08/26/24 1327 08/26/24 1324       Functional Assessment    Outcome Measure Options AM-PAC 6 Clicks Basic Mobility (PT);Modified Lewis  - AM-PAC 6 Clicks Daily Activity (OT);Modified Emmanuel  -              User Key  (r) = Recorded By, (t) = Taken By, (c) = Cosigned By      Initials Name Provider Type    LC Erica Monten[place], OT Occupational Therapist    Josefina Perez, PT Physical Therapist    Karo Harrell RN Registered Nurse                                 Physical Therapy Education       Title: PT OT SLP Therapies (In Progress)       Topic: Physical Therapy (In Progress)       Point: Mobility training (Done)       Learning Progress Summary             Patient FEROZ Nicolas VU, DU,NR by  at 8/26/2024 1327    Comment: Educated pt. safety/technique w/bed mobility, transfers, ambulation, PT POC                         Point: Home exercise program (Not Started)       Learner Progress:  Not documented in this visit.              Point: Body mechanics (Done)       Learning Progress Summary             Patient FEROZ Nicolas, KATHRIN,VENUS,NR by  at 8/26/2024 1327    Comment: Educated pt. safety/technique w/bed mobility, transfers, ambulation, PT POC                         Point: Precautions (Done)       Learning Progress Summary             Patient FEROZ Nicolas, KATHRIN,VENUS,NR by  at 8/26/2024 1327    Comment: Educated pt. safety/technique w/bed mobility, transfers, ambulation, PT POC                                         User Key       Initials Effective Dates Name Provider Type [facility] 06/01/21 -  Josefina Vasques, PT Physical Therapist PT                  PT Recommendation and Plan  Planned Therapy Interventions (PT): balance training, bed mobility training, home exercise program, gait training, neuromuscular re-education, patient/family education,  postural re-education, motor coordination training, ROM (range of motion), strengthening, stretching, transfer training, wheelchair management/propulsion training  Plan of Care Reviewed With: patient  Outcome Evaluation: Pt. presents below baseline function w/generalized weakness, balance deficits and decreased functional endurance affecting his ability to safely participate in functional mobility. He performed bed mobility, transfers and ambulated 60' w/front wheeled walker, min assist. Activity limited by fatigue. Pt. would benefit from acute PT services to address stated deficits.     Time Calculation:   PT Evaluation Complexity  History, PT Evaluation Complexity: 3 or more personal factors and/or comorbidities  Examination of Body Systems (PT Eval Complexity): total of 4 or more elements  Clinical Presentation (PT Evaluation Complexity): evolving  Clinical Decision Making (PT Evaluation Complexity): low complexity  Overall Complexity (PT Evaluation Complexity): low complexity     PT Charges       Row Name 08/26/24 1328             Time Calculation    Start Time 0947  -SS      PT Received On 08/26/24  -SS      PT Goal Re-Cert Due Date 09/05/24  -SS         Untimed Charges    PT Eval/Re-eval Minutes 51  -SS         Total Minutes    Untimed Charges Total Minutes 51  -SS       Total Minutes 51  -SS                User Key  (r) = Recorded By, (t) = Taken By, (c) = Cosigned By      Initials Name Provider Type    SS Josefina Vasques, PT Physical Therapist                  Therapy Charges for Today       Code Description Service Date Service Provider Modifiers Qty    63653677908 HC PT EVAL LOW COMPLEXITY 4 8/26/2024 Josefina Vasques, PT GP 1    60386820082 HC PT THER SUPP EA 15 MIN 8/26/2024 Josefina Vasques PT GP 1            PT G-Codes  Outcome Measure Options: AM-PAC 6 Clicks Basic Mobility (PT), Modified Fisher  AM-PAC 6 Clicks Score (PT): 17  AM-PAC 6 Clicks Score (OT): 15  Modified Emmanuel Scale: 4 - Moderately severe  disability.  Unable to walk without assistance, and unable to attend to own bodily needs without assistance.  PT Discharge Summary  Anticipated Discharge Disposition (PT): inpatient rehabilitation facility    Josefina Vasques, DENITA  8/26/2024

## 2024-08-26 NOTE — THERAPY EVALUATION
Patient Name: Gurdeep Burrows  : 1935    MRN: [id number]                              Today's Date: 2024       Admit Date: 2024    Visit Dx:     ICD-10-CM ICD-9-CM   1. Slurred speech  R47.81 784.59   2. Generalized weakness  R53.1 780.79   3. Acute renal insufficiency  N28.9 593.9   4. Cognitive communication deficit  R41.841 799.52   5. Acute on chronic combined systolic and diastolic CHF (congestive heart failure)  I50.43 428.43     428.0     Patient Active Problem List   Diagnosis    TIA (transient ischemic attack)    History of coronary artery stent placement    Gout    Hyperlipidemia    Irritable bowel syndrome    Foraminal stenosis of lumbar region    Tremor    Coronary artery disease involving native coronary artery of native heart without angina pectoris    Type 2 diabetes mellitus    Acute on chronic combined systolic and diastolic CHF (congestive heart failure)    Mild mitral stenosis     Past Medical History:   Diagnosis Date    CAD (coronary artery disease) 2023    RCA occlusions, LHC done with stents placed at [facility]    Dementia     Diabetes     Hypertension     Obesity hypoventilation syndrome      Past Surgical History:   Procedure Laterality Date    BACK SURGERY      lumbar laminectomy?    CATARACT EXTRACTION      KNEE ARTHROPLASTY      LEFT HEART CATH      ORIF HIP FRACTURE        General Information       Row Name 24 1311          OT Time and Intention    Document Type evaluation  -     Mode of Treatment occupational therapy  -       Row Name 24 1311          General Information    Patient Profile Reviewed yes  -     Prior Level of Function independent:;all household mobility;transfer;ADL's  Reports use of RW and w/c for longer distances.  -     Existing Precautions/Restrictions fall;oxygen therapy device and L/min  reports use of Bilateral AFO's  -     Barriers to Rehab previous functional deficit  -       Row Name 24 1311          Living  Environment    People in Home alone  -       Row Name 08/26/24 1311          Home Main Entrance    Number of Stairs, Main Entrance none;other (see comments)  Ramp  -       Row Name 08/26/24 1311          Stairs Within Home, Primary    Number of Stairs, Within Home, Primary none  -       Row Name 08/26/24 1311          Cognition    Orientation Status (Cognition) oriented x 3  -       Row Name 08/26/24 1311          Safety Issues, Functional Mobility    Safety Issues Affecting Function (Mobility) insight into deficits/self-awareness;awareness of need for assistance;safety precaution awareness;safety precautions follow-through/compliance;sequencing abilities  -     Impairments Affecting Function (Mobility) balance;endurance/activity tolerance;postural/trunk control;strength;shortness of breath  -               User Key  (r) = Recorded By, (t) = Taken By, (c) = Cosigned By      Initials Name Provider Type     Erica Montenegro OT Occupational Therapist                     Mobility/ADL's       Row Name 08/26/24 1315          Bed Mobility    Bed Mobility scooting/bridging;supine-sit  -     Scooting/Bridging [place] (Bed Mobility) verbal cues;minimum assist (75% patient effort)  -     Supine-Sit Andrews (Bed Mobility) verbal cues;moderate assist (50% patient effort)  -     Assistive Device (Bed Mobility) bed rails;head of bed elevated  -     Comment, (Bed Mobility) VC's to sequence transitioning from supine to to sit EOB. Assist to bring hips to EOB and trunk into upright position.  -       Row Name 08/26/24 1315          Transfers    Transfers sit-stand transfer;bed-chair transfer  -       Row Name 08/26/24 1315          Bed-Chair Transfer    Bed-Chair Andrews (Transfers) minimum assist (75% patient effort);verbal cues  -     Assistive Device (Bed-Chair Transfers) walker, front-wheeled  -       Row Name 08/26/24 1315          Sit-Stand Transfer    Sit-Stand Andrews  (Transfers) minimum assist (75% patient effort);verbal cues  -     Assistive Device (Sit-Stand Transfers) walker, front-wheeled  -       Row Name 08/26/24 1315          Activities of Daily Living    BADL Assessment/Intervention lower body dressing;grooming;upper body dressing  -[facility] Name 08/26/24 1315          Lower Body Dressing Assessment/Training    [place] Level (Lower Body Dressing) lower body dressing skills;maximum assist (25% patient effort)  -[facility] Name 08/26/24 1315          Grooming Assessment/Training    [place] Level (Grooming) wash face, hands;set up  -     Position (Grooming) supported sitting  -[facility] Name 08/26/24 1315          Upper Body Dressing Assessment/Training    [place] Level (Upper Body Dressing) don;pajama/robe;minimum assist (75% patient effort)  -     Position (Upper Body Dressing) unsupported sitting;edge of bed sitting  -               User Key  (r) = Recorded By, (t) = Taken By, (c) = Cosigned By      Initials Name Provider Type     Erica Montneegro OT Occupational Therapist                   Obj/Interventions       [facility] Name 08/26/24 1317          Sensory Assessment (Somatosensory)    Sensory Assessment (Somatosensory) UE sensation intact  -LC       Row Name 08/26/24 1317          Vision Assessment/Intervention    Visual Impairment/Limitations corrective lenses for reading  -[facility] Name 08/26/24 1317          Range of Motion Comprehensive    General Range of Motion bilateral upper extremity ROM WFL  -[facility] Name 08/26/24 1317          Strength Comprehensive (MMT)    General Manual Muscle Testing (MMT) Assessment upper extremity strength deficits identified  -     Comment, General Manual Muscle Testing (MMT) Assessment BUE grossly 4-/5  -[facility] Name 08/26/24 1317          Motor Skills    Motor Skills functional endurance  -     Functional Endurance decreased activity tolerance  -[facility] Name 08/26/24 1317           Balance    Balance Assessment sitting static balance;sitting dynamic balance;standing static balance;standing dynamic balance  -     Static Sitting Balance verbal cues;standby assist  -     Dynamic Sitting Balance contact guard;verbal cues  -LC     Position, Sitting Balance unsupported;sitting edge of bed  -     Static Standing Balance contact guard;verbal cues  -LC     Dynamic Standing Balance minimal assist;verbal cues  -     Position/Device Used, Standing Balance walker, front-wheeled  -     Balance Interventions sitting;standing;sit to stand;supported;occupation based/functional task;weight shifting activity  -     Comment, Balance CGA-Min A to steady  -               User Key  (r) = Recorded By, (t) = Taken By, (c) = Cosigned By      Initials Name Provider Type     Erica Montenegro OT Occupational Therapist                   Goals/Plan       Row Name 08/26/24 1323          Transfer Goal 1 (OT)    Activity/Assistive Device (Transfer Goal 1, OT) sit-to-stand/stand-to-sit;bed-to-chair/chair-to-bed;walker, rolling;toilet  -     [place] Level/Cues Needed (Transfer Goal 1, OT) contact guard required;verbal cues required  -     Time Frame (Transfer Goal 1, OT) short term goal (STG);3 days  -     Progress/Outcome (Transfer Goal 1, OT) goal ongoing  -       Row Name 08/26/24 1323          Dressing Goal 1 (OT)    Activity/Device (Dressing Goal 1, OT) lower body dressing;long-handled shoe horn;reacher;sock-aid  -     [place]/Cues Needed (Dressing Goal 1, OT) moderate assist (50-74% patient effort);verbal cues required  -     Time Frame (Dressing Goal 1, OT) long term goal (LTG);by discharge  -     Progress/Outcome (Dressing Goal 1, OT) goal ongoing  -       Row Name 08/26/24 1323          Toileting Goal 1 (OT)    Activity/Device (Toileting Goal 1, OT) adjust/manage clothing;perform perineal hygiene;commode;grab bar/safety frame  -     [place] Level/Cues Needed (Toileting  Goal 1, OT) moderate assist (50-74% patient effort)  -     Time Frame (Toileting Goal 1, OT) long term goal (LTG);by discharge  -     Progress/Outcome (Toileting Goal 1, OT) goal ongoing  -[facility] Name 08/26/24 1323          Therapy Assessment/Plan (OT)    Planned Therapy Interventions (OT) activity tolerance training;adaptive equipment training;BADL retraining;functional balance retraining;occupation/activity based interventions;patient/caregiver education/training;transfer/mobility retraining;strengthening exercise  -               User Key  (r) = Recorded By, (t) = Taken By, (c) = Cosigned By      Initials Name Provider Type     Erica Montenegro, OT Occupational Therapist                   Clinical Impression       Row Name 08/26/24 1320 08/26/24 1319       Pain Assessment    Pretreatment Pain Rating 0/10 - no pain  - --    Posttreatment Pain Rating 0/10 - no pain  - --    Additional Documentation Pain Scale: Word Pre/Post-Treatment (Group)  - Pain Scale: Word Pre/Post-Treatment (Group)  -[facility] Name 08/26/24 1320 08/26/24 1319       Plan of Care Review    Plan of Care Reviewed With patient  -LC --    Progress no change  - no change  -    Outcome Evaluation Pt. presents below baseline with ADLs and functional mobility. Limited by decreased activity tolerance, generalized weakness, and balance. Skilled OT services warranted to promote return to PLOF. Recommend IPR at discharge for best functional outcomes.  - --      [facility] Name 08/26/24 1320          Therapy Assessment/Plan (OT)    Patient/Family Therapy Goal Statement (OT) To take care of self  -     Therapy Frequency (OT) daily  -     Predicted Duration of Therapy Intervention (OT) 5 days  -       Row Name 08/26/24 1320          Therapy Plan Review/Discharge Plan (OT)    Anticipated Discharge Disposition (OT) inpatient rehabilitation facility  -[facility] Name 08/26/24 1320          Vital Signs    Pre Systolic BP Rehab 107  -      Pre Treatment Diastolic BP 77  -LC     Post Systolic BP Rehab 113  -LC     Post Treatment Diastolic BP 65  -LC     Pre SpO2 (%) 97  -LC     O2 Delivery Pre Treatment nasal cannula  -LC     Intra SpO2 (%) 89  -LC     O2 Delivery Intra Treatment room air  -LC     Post SpO2 (%) 96  -LC     O2 Delivery Post Treatment nasal cannula  -LC     Pre Patient Position Supine  -LC     Post Patient Position Sitting  -LC       Row Name 08/26/24 1320          Positioning and Restraints    Pre-Treatment Position in bed  -LC     Post Treatment Position chair  -LC     In Chair notified nsg;reclined;call light within reach;encouraged to call for assist;exit alarm on;waffle cushion;legs elevated;on mechanical lift sling  -LC               User Key  (r) = Recorded By, (t) = Taken By, (c) = Cosigned By      Initials Name Provider Type    LC Erica Montenegro, STEPHANIA Occupational Therapist                   Outcome Measures       Row Name 08/26/24 1324          How much help from another is currently needed...    Putting on and taking off regular lower body clothing? 1  -LC     Bathing (including washing, rinsing, and drying) 2  -LC     Toileting (which includes using toilet bed pan or urinal) 2  -LC     Putting on and taking off regular upper body clothing 3  -LC     Taking care of personal grooming (such as brushing teeth) 3  -LC     Eating meals 4  -LC     AM-PAC 6 Clicks Score (OT) 15  -LC       Row Name 08/26/24 1327 08/26/24 0800       How much help from another person do you currently need...    Turning from your back to your side while in flat bed without using bedrails? 3  -SS 3  -JM    Moving from lying on back to sitting on the side of a flat bed without bedrails? 3  -SS 3  -JM    Moving to and from a bed to a chair (including a wheelchair)? 3  -SS 2  -JM    Standing up from a chair using your arms (e.g., wheelchair, bedside chair)? 3  -SS 2  -JM    Climbing 3-5 steps with a railing? 2  -SS 2  -JM    To walk in hospital room? 3  -SS  2  -    AM-PAC 6 Clicks Score (PT) 17  -SS 14  -    Highest Level of Mobility Goal 5 --> Static standing  -SS 4 --> Transfer to chair/commode  -      Row Name 08/26/24 1327 08/26/24 1324       Modified [place] Scale    Pre-Stroke Modified [place] Scale 6 - Unable to determine (UTD) from the medical record documentation  -SS 6 - Unable to determine (UTD) from the medical record documentation  -    Modified [place] Scale 4 - Moderately severe disability.  Unable to walk without assistance, and unable to attend to own bodily needs without assistance.  -SS 4 - Moderately severe disability.  Unable to walk without assistance, and unable to attend to own bodily needs without assistance.  -      Row Name 08/26/24 1327 08/26/24 1324       Functional Assessment    Outcome Measure Options AM-PAC 6 Clicks Basic Mobility (PT);Modified Emmanuel  -SS AM-PAC 6 Clicks Daily Activity (OT);Modified Emmanuel  -LC              User Key  (r) = Recorded By, (t) = Taken By, (c) = Cosigned By      Initials Name Provider Type    Erica Osborne, OT Occupational Therapist    Josefina Perez, PT Physical Therapist    Karo Harrell, RN Registered Nurse                    Occupational Therapy Education       Title: PT OT SLP Therapies (In Progress)       Topic: Occupational Therapy (In Progress)       Point: ADL training (In Progress)       Description:   Instruct learner(s) on proper safety adaptation and remediation techniques during self care or transfers.   Instruct in proper use of assistive devices.                  Learning Progress Summary             Patient Acceptance, E, NR by  at 8/26/2024 1036                         Point: Home exercise program (Not Started)       Description:   Instruct learner(s) on appropriate technique for monitoring, assisting and/or progressing therapeutic exercises/activities.                  Learner Progress:  Not documented in this visit.              Point: Precautions (In Progress)        Description:   Instruct learner(s) on prescribed precautions during self-care and functional transfers.                  Learning Progress Summary             Patient Acceptance, E, NR by  at 8/26/2024 1036                         Point: Body mechanics (In Progress)       Description:   Instruct learner(s) on proper positioning and spine alignment during self-care, functional mobility activities and/or exercises.                  Learning Progress Summary             Patient Acceptance, E, NR by  at 8/26/2024 1036                                         User Key       Initials Effective Dates Name Provider Type Discipline     06/16/21 -  Erica Montenegro OT Occupational Therapist OT                  OT Recommendation and Plan  Planned Therapy Interventions (OT): activity tolerance training, adaptive equipment training, BADL retraining, functional balance retraining, occupation/activity based interventions, patient/caregiver education/training, transfer/mobility retraining, strengthening exercise  Therapy Frequency (OT): daily  Plan of Care Review  Plan of Care Reviewed With: patient  Progress: no change  Outcome Evaluation: Pt. presents below baseline with ADLs and functional mobility. Limited by decreased activity tolerance, generalized weakness, and balance. Skilled OT services warranted to promote return to PLOF. Recommend IPR at discharge for best functional outcomes.     Time Calculation:   Evaluation Complexity (OT)  Review Occupational Profile/Medical/Therapy History Complexity: brief/low complexity  Assessment, Occupational Performance/Identification of Deficit Complexity: 1-3 performance deficits  Clinical Decision Making Complexity (OT): problem focused assessment/low complexity  Overall Complexity of Evaluation (OT): low complexity     Time Calculation- OT       Row Name 08/26/24 1326             Time Calculation- OT    OT Start Time 1036  -      OT Received On 08/26/24  -      OT Goal Re-Cert  Due Date 09/05/24  -LC         Untimed Charges    OT Eval/Re-eval Minutes 53  -LC         Total Minutes    Untimed Charges Total Minutes 53  -LC       Total Minutes 53  -LC                User Key  (r) = Recorded By, (t) = Taken By, (c) = Cosigned By      Initials Name Provider Type    Erica Osborne OT Occupational Therapist                  Therapy Charges for Today       Code Description Service Date Service Provider Modifiers Qty    92178791201 HC OT EVAL LOW COMPLEXITY 4 8/26/2024 Erica Montenegro OT GO 1                 Erica Montenegro OT  8/26/2024

## 2024-08-26 NOTE — PLAN OF CARE
Goal Outcome Evaluation:  Plan of Care Reviewed With: patient           Outcome Evaluation: Pt. presents below baseline function w/generalized weakness, balance deficits and decreased functional endurance affecting his ability to safely participate in functional mobility. He performed bed mobility, transfers and ambulated 60' w/front wheeled walker, min assist. Activity limited by fatigue. Pt. would benefit from acute PT services to address stated deficits.      Anticipated Discharge Disposition (PT): inpatient rehabilitation facility

## 2024-08-26 NOTE — PLAN OF CARE
Goal Outcome Evaluation:  Plan of Care Reviewed With: patient        Progress: no change  Outcome Evaluation: Pt. presents below baseline with ADLs and functional mobility. Limited by decreased activity tolerance, generalized weakness, and balance. Skilled OT services warranted to promote return to PLOF. Recommend IPR at discharge for best functional outcomes.      Anticipated Discharge Disposition (OT): inpatient rehabilitation facility

## 2024-08-27 NOTE — OUTREACH NOTE
Prep Survey      Flowsheet Row Responses   [facility] facility patient discharged from? [place]   Is LACE score < 7 ? Yes   Eligibility Readm Mgmt   Discharge diagnosis Transient speech disturbance   Does the patient have one of the following disease processes/diagnoses(primary or secondary)? Other   Does the patient have Home health ordered? No   Is there a DME ordered? No   Prep survey completed? Yes            Noemi LOVELACE - Registered Nurse

## 2024-08-28 LAB
BACTERIA SPEC AEROBE CULT: ABNORMAL
GRAM STN SPEC: ABNORMAL
GRAM STN SPEC: ABNORMAL
ISOLATED FROM: ABNORMAL

## 2024-08-30 LAB — BACTERIA SPEC AEROBE CULT: NORMAL
